# Patient Record
Sex: MALE | Race: BLACK OR AFRICAN AMERICAN | Employment: FULL TIME | URBAN - METROPOLITAN AREA
[De-identification: names, ages, dates, MRNs, and addresses within clinical notes are randomized per-mention and may not be internally consistent; named-entity substitution may affect disease eponyms.]

---

## 2017-12-09 ENCOUNTER — OFFICE VISIT (OUTPATIENT)
Dept: FAMILY MEDICINE CLINIC | Facility: CLINIC | Age: 37
End: 2017-12-09

## 2017-12-09 VITALS
SYSTOLIC BLOOD PRESSURE: 122 MMHG | RESPIRATION RATE: 16 BRPM | DIASTOLIC BLOOD PRESSURE: 78 MMHG | WEIGHT: 239 LBS | BODY MASS INDEX: 31.68 KG/M2 | TEMPERATURE: 99 F | HEIGHT: 73 IN | HEART RATE: 71 BPM

## 2017-12-09 DIAGNOSIS — S39.011A STRAIN OF ABDOMINAL MUSCLE, INITIAL ENCOUNTER: ICD-10-CM

## 2017-12-09 DIAGNOSIS — M99.02 THORACIC REGION SOMATIC DYSFUNCTION: ICD-10-CM

## 2017-12-09 DIAGNOSIS — R39.15 URINARY URGENCY: ICD-10-CM

## 2017-12-09 DIAGNOSIS — R14.3 EXCESSIVE FLATUS: ICD-10-CM

## 2017-12-09 DIAGNOSIS — R10.9 RIGHT FLANK DISCOMFORT: Primary | ICD-10-CM

## 2017-12-09 PROCEDURE — 98927 OSTEOPATH MANJ 5-6 REGIONS: CPT | Performed by: FAMILY MEDICINE

## 2017-12-09 PROCEDURE — 99214 OFFICE O/P EST MOD 30 MIN: CPT | Performed by: FAMILY MEDICINE

## 2017-12-09 PROCEDURE — 99212 OFFICE O/P EST SF 10 MIN: CPT | Performed by: FAMILY MEDICINE

## 2017-12-09 NOTE — PROGRESS NOTES
HPI:    Patient ID: Tramaine Devries is a 44year old male. Flank Pain    Pain location: right flank and RUQ of abdomen. This is a new problem. The current episode started more than 1 month ago. The problem occurs intermittently.  The quality of the Constitutional: He is oriented to person, place, and time. He appears well-developed and well-nourished. No distress. Cardiovascular: Normal rate, regular rhythm and normal heart sounds. No murmur heard.   Pulmonary/Chest: Effort normal and breath so

## 2017-12-23 ENCOUNTER — HOSPITAL ENCOUNTER (OUTPATIENT)
Dept: ULTRASOUND IMAGING | Facility: HOSPITAL | Age: 37
Discharge: HOME | End: 2017-12-23
Attending: FAMILY MEDICINE
Payer: COMMERCIAL

## 2017-12-23 DIAGNOSIS — R14.3 EXCESSIVE FLATUS: ICD-10-CM

## 2017-12-23 PROCEDURE — 76705 ECHO EXAM OF ABDOMEN: CPT | Performed by: FAMILY MEDICINE

## 2018-01-12 ENCOUNTER — HOSPITAL ENCOUNTER (OUTPATIENT)
Age: 38
Discharge: HOME | End: 2018-01-12
Attending: EMERGENCY MEDICINE
Payer: COMMERCIAL

## 2018-01-12 VITALS
OXYGEN SATURATION: 96 % | DIASTOLIC BLOOD PRESSURE: 54 MMHG | TEMPERATURE: 98 F | WEIGHT: 240 LBS | HEIGHT: 73 IN | SYSTOLIC BLOOD PRESSURE: 122 MMHG | RESPIRATION RATE: 18 BRPM | BODY MASS INDEX: 31.81 KG/M2 | HEART RATE: 65 BPM

## 2018-01-12 DIAGNOSIS — H65.92 LEFT OTITIS MEDIA WITH EFFUSION: Primary | ICD-10-CM

## 2018-01-12 DIAGNOSIS — J03.90 EXUDATIVE TONSILLITIS: ICD-10-CM

## 2018-01-12 LAB — S PYO AG THROAT QL: NEGATIVE

## 2018-01-12 PROCEDURE — 99214 OFFICE O/P EST MOD 30 MIN: CPT

## 2018-01-12 PROCEDURE — 99213 OFFICE O/P EST LOW 20 MIN: CPT

## 2018-01-12 PROCEDURE — 87430 STREP A AG IA: CPT

## 2018-01-12 RX ORDER — AMOXICILLIN 875 MG/1
875 TABLET, COATED ORAL 2 TIMES DAILY
Qty: 20 TABLET | Refills: 0 | Status: SHIPPED | OUTPATIENT
Start: 2018-01-12 | End: 2018-01-22

## 2018-01-13 NOTE — ED PROVIDER NOTES
Patient Seen in: [facility]    History   Patient presents with:  Sore Throat    Stated Complaint: sore throat    HPI    Patient is a 25-year-old male with no significant past medical history presents now with sore throat murmur  Abdomen: Soft, nontender and nondistended  Neurologic: Patient is awake, alert and oriented ×3.   The patient's motor strength is 5 out of 5 and symmetric in the upper and lower extremities bilaterally  Extremities: No focal swelling or tenderness

## 2018-02-01 ENCOUNTER — TELEPHONE (OUTPATIENT)
Dept: SURGERY | Facility: CLINIC | Age: 38
End: 2018-02-01

## 2018-02-01 NOTE — TELEPHONE ENCOUNTER
Pt was in today at 3:00pm for consult with Dr. Sulma Mcdonnell. Pt waited until 4:00pm in the exam room and had to leave without seeing the doctor.  I apologized to the patient for the wait, pt stated he could not wait and asked to be rescheduled with Dr. Sally Rosario

## 2018-02-06 NOTE — TELEPHONE ENCOUNTER
I called pt LM to call the office back. I was going to offer pt today with Dr. Rangel Lantigua in the office at 2:00pm for consult. However I just LMTCB.

## 2018-02-08 ENCOUNTER — OFFICE VISIT (OUTPATIENT)
Dept: SURGERY | Facility: CLINIC | Age: 38
End: 2018-02-08

## 2018-02-08 VITALS
BODY MASS INDEX: 31.14 KG/M2 | DIASTOLIC BLOOD PRESSURE: 60 MMHG | HEIGHT: 73 IN | HEART RATE: 68 BPM | WEIGHT: 235 LBS | RESPIRATION RATE: 16 BRPM | TEMPERATURE: 99 F | SYSTOLIC BLOOD PRESSURE: 128 MMHG

## 2018-02-08 DIAGNOSIS — R63.1 POLYDIPSIA: ICD-10-CM

## 2018-02-08 DIAGNOSIS — Z80.42 FAMILY HISTORY OF PROSTATE CANCER: ICD-10-CM

## 2018-02-08 DIAGNOSIS — R33.9 INCOMPLETE EMPTYING OF BLADDER: ICD-10-CM

## 2018-02-08 DIAGNOSIS — R35.0 URINARY FREQUENCY: Primary | ICD-10-CM

## 2018-02-08 DIAGNOSIS — R35.1 NOCTURIA: ICD-10-CM

## 2018-02-08 LAB
BACTERIA UR QL AUTO: NEGATIVE /HPF
BILIRUB UR QL: NEGATIVE
CLARITY UR: CLEAR
COLOR UR: YELLOW
GLUCOSE UR-MCNC: NEGATIVE MG/DL
HGB UR QL STRIP.AUTO: NEGATIVE
KETONES UR-MCNC: NEGATIVE MG/DL
LEUKOCYTE ESTERASE UR QL STRIP.AUTO: NEGATIVE
NITRITE UR QL STRIP.AUTO: NEGATIVE
PH UR: 6 [PH] (ref 5–8)
PROT UR-MCNC: NEGATIVE MG/DL
RBC #/AREA URNS AUTO: 1 /HPF
SP GR UR STRIP: 1.02 (ref 1–1.03)
UROBILINOGEN UR STRIP-ACNC: 2
VIT C UR-MCNC: 20 MG/DL
WBC #/AREA URNS AUTO: <1 /HPF

## 2018-02-08 PROCEDURE — 99212 OFFICE O/P EST SF 10 MIN: CPT | Performed by: UROLOGY

## 2018-02-08 PROCEDURE — 99245 OFF/OP CONSLTJ NEW/EST HI 55: CPT | Performed by: UROLOGY

## 2018-02-08 NOTE — PATIENT INSTRUCTIONS
1. Urine specimen for complete urinalysis    2. Intake voiding diary for 3 separate days and nights--please try to fill it out completely. Please bring the filled out form with you the next time you see me in the office. Very important information.

## 2018-02-08 NOTE — PROGRESS NOTES
Francine Rey is a 44year old male. HPI:   Patient presents with:  Urinary Frequency  Urinary Symptoms (urologic)      History provided by pt.     Voiding difficulties --  His American urologic Association voiding score is  20,  severe  voiding d normal findings.  11/20/2013 office visit with Dr. Samreen López; provided him with samples of Rapaflo 8 mg daily as well as Toviaz 4 mg daily;  neither of which improved his symptoms; Dr. Samreen López in note stated = \"it is not clear to me that the patient's symptoms extremity and irregular heartbeat/palpitations  Constitutional:  Negative for decreased activity, fever, irritability and lethargy  ENMT:  Negative for ear drainage and nasal drainage; positive for hearing loss   Eyes:  Negative for eye discharge and visio clubbing; edema none      LABORATORIES    04/08/2014 creatinine = 1.26; eGFRAA >60  03/10/2014 UA RBC = 2  10/01/2012 PSA = 0.4    UROLOGICAL IMAGING     11/17/14 CT of abdomen and pelvis with contrast  Kidneys were normal.  Bladder small with moderate dif present, however, he chooses to proceed with Urinalysis, intake voiding diary, bladder Ultrasound, and follow up in a few weeks with probable uroflow testing at that time.    (R33.9) Incomplete emptying of bladder  Please see above.   Bladder ultrasound kayode Tereza Og MD.   Electronically Signed: Sandra Bell, 2/8/2018, 3:44 PM.    Lili Kay MD,  personally performed the services described in this documentation.  All medical record entries made by the scribe were at my direction and in my pre

## 2018-02-21 ENCOUNTER — HOSPITAL ENCOUNTER (EMERGENCY)
Facility: HOSPITAL | Age: 38
Discharge: HOME | End: 2018-02-21
Attending: EMERGENCY MEDICINE
Payer: COMMERCIAL

## 2018-02-21 VITALS
TEMPERATURE: 98 F | HEART RATE: 70 BPM | WEIGHT: 231 LBS | DIASTOLIC BLOOD PRESSURE: 68 MMHG | RESPIRATION RATE: 18 BRPM | HEIGHT: 73 IN | BODY MASS INDEX: 30.62 KG/M2 | SYSTOLIC BLOOD PRESSURE: 124 MMHG | OXYGEN SATURATION: 98 %

## 2018-02-21 DIAGNOSIS — R42 VERTIGO: Primary | ICD-10-CM

## 2018-02-21 LAB
ANION GAP SERPL CALC-SCNC: 9 MMOL/L (ref 0–18)
BASOPHILS # BLD: 0 K/UL (ref 0–0.2)
BASOPHILS NFR BLD: 0 %
BUN SERPL-MCNC: 11 MG/DL (ref 8–20)
BUN/CREAT SERPL: 9.3 (ref 10–20)
CALCIUM SERPL-MCNC: 8.9 MG/DL (ref 8.5–10.5)
CHLORIDE SERPL-SCNC: 101 MMOL/L (ref 95–110)
CO2 SERPL-SCNC: 27 MMOL/L (ref 22–32)
CREAT SERPL-MCNC: 1.18 MG/DL (ref 0.5–1.5)
EOSINOPHIL # BLD: 0.1 K/UL (ref 0–0.7)
EOSINOPHIL NFR BLD: 2 %
ERYTHROCYTE [DISTWIDTH] IN BLOOD BY AUTOMATED COUNT: 12.6 % (ref 11–15)
GLUCOSE SERPL-MCNC: 92 MG/DL (ref 70–99)
HCT VFR BLD AUTO: 40.5 % (ref 41–52)
HGB BLD-MCNC: 13 G/DL (ref 13.5–17.5)
LYMPHOCYTES # BLD: 2.7 K/UL (ref 1–4)
LYMPHOCYTES NFR BLD: 38 %
MCH RBC QN AUTO: 29.7 PG (ref 27–32)
MCHC RBC AUTO-ENTMCNC: 32.2 G/DL (ref 32–37)
MCV RBC AUTO: 92.1 FL (ref 80–100)
MONOCYTES # BLD: 0.8 K/UL (ref 0–1)
MONOCYTES NFR BLD: 10 %
NEUTROPHILS # BLD AUTO: 3.7 K/UL (ref 1.8–7.7)
NEUTROPHILS NFR BLD: 50 %
OSMOLALITY UR CALC.SUM OF ELEC: 283 MOSM/KG (ref 275–295)
PLATELET # BLD AUTO: 187 K/UL (ref 140–400)
PMV BLD AUTO: 9.6 FL (ref 7.4–10.3)
POTASSIUM SERPL-SCNC: 3.9 MMOL/L (ref 3.3–5.1)
RBC # BLD AUTO: 4.4 M/UL (ref 4.5–5.9)
SODIUM SERPL-SCNC: 137 MMOL/L (ref 136–144)
WBC # BLD AUTO: 7.3 K/UL (ref 4–11)

## 2018-02-21 PROCEDURE — 85025 COMPLETE CBC W/AUTO DIFF WBC: CPT | Performed by: EMERGENCY MEDICINE

## 2018-02-21 PROCEDURE — 80048 BASIC METABOLIC PNL TOTAL CA: CPT | Performed by: EMERGENCY MEDICINE

## 2018-02-21 PROCEDURE — 93010 ELECTROCARDIOGRAM REPORT: CPT | Performed by: INTERNAL MEDICINE

## 2018-02-21 PROCEDURE — 93005 ELECTROCARDIOGRAM TRACING: CPT

## 2018-02-21 PROCEDURE — 99284 EMERGENCY DEPT VISIT MOD MDM: CPT

## 2018-02-21 PROCEDURE — 36415 COLL VENOUS BLD VENIPUNCTURE: CPT

## 2018-02-21 PROCEDURE — 80048 BASIC METABOLIC PNL TOTAL CA: CPT

## 2018-02-21 PROCEDURE — 85025 COMPLETE CBC W/AUTO DIFF WBC: CPT

## 2018-02-21 RX ORDER — MECLIZINE HYDROCHLORIDE 25 MG/1
25 TABLET ORAL ONCE
Status: COMPLETED | OUTPATIENT
Start: 2018-02-21 | End: 2018-02-21

## 2018-02-21 RX ORDER — MECLIZINE HYDROCHLORIDE 25 MG/1
25 TABLET ORAL 3 TIMES DAILY PRN
Qty: 20 TABLET | Refills: 0 | Status: SHIPPED | OUTPATIENT
Start: 2018-02-21 | End: 2021-03-15 | Stop reason: ALTCHOICE

## 2018-02-22 NOTE — ED PROVIDER NOTES
Patient Seen in: [facility] Emergency Department    History   Patient presents with:  Dizziness (neurologic)    Stated Complaint: lightheaded     HPI    Patient is a 76-year-old male who presents to the emergency department with a chief complaint There is no tenderness. There is no rebound. Musculoskeletal: Normal range of motion. Neurological: He is alert and oriented to person, place, and time. He has normal strength. No cranial nerve deficit or sensory deficit.  He displays a negative Romberg [address] [facility]  [phone]    Schedule an appointment as soon as possible for a visit in 3 days          Medications Prescribed:  Current Discharge Medication List    START taking these medications    Meclizine HCl 25 MG Oral Tab  Take 1 tablet (

## 2018-09-24 ENCOUNTER — NURSE TRIAGE (OUTPATIENT)
Dept: OTHER | Age: 38
End: 2018-09-24

## 2018-09-24 NOTE — TELEPHONE ENCOUNTER
Contacted patient as appears patient has not called back. LMTCB to assess symptoms. Transfer to [phone].

## 2018-09-24 NOTE — TELEPHONE ENCOUNTER
Wife calling stating patient having pain in his side however she is not with patient but has him on the other line on her phone. Advised patient wife to have patient call back directly so he can be further assessed.  Wife verbalized understanding

## 2018-09-25 NOTE — TELEPHONE ENCOUNTER
Action Requested: Summary for Provider     []  Critical Lab, Recommendations Needed  [] Need Additional Advice  [x]   FYI    []   Need Orders  [] Need Medications Sent to Pharmacy  []  Other     SUMMARY: Patient complaining of right rib/abdominal pain on t

## 2018-10-15 ENCOUNTER — OFFICE VISIT (OUTPATIENT)
Dept: FAMILY MEDICINE CLINIC | Facility: CLINIC | Age: 38
End: 2018-10-15

## 2018-10-15 VITALS
HEIGHT: 73 IN | DIASTOLIC BLOOD PRESSURE: 75 MMHG | BODY MASS INDEX: 31.68 KG/M2 | SYSTOLIC BLOOD PRESSURE: 123 MMHG | RESPIRATION RATE: 17 BRPM | WEIGHT: 239 LBS | HEART RATE: 71 BPM | TEMPERATURE: 98 F

## 2018-10-15 DIAGNOSIS — R07.89 CHEST WALL PAIN: ICD-10-CM

## 2018-10-15 DIAGNOSIS — M99.02 THORACIC REGION SOMATIC DYSFUNCTION: Primary | ICD-10-CM

## 2018-10-15 PROCEDURE — 99214 OFFICE O/P EST MOD 30 MIN: CPT | Performed by: FAMILY MEDICINE

## 2018-10-15 PROCEDURE — 98925 OSTEOPATH MANJ 1-2 REGIONS: CPT | Performed by: FAMILY MEDICINE

## 2018-10-15 PROCEDURE — 99212 OFFICE O/P EST SF 10 MIN: CPT | Performed by: FAMILY MEDICINE

## 2018-10-15 NOTE — PROGRESS NOTES
HPI:    Patient ID: Lan Rivera is a 43year old male. Abdominal Pain   This is a chronic problem. The current episode started more than 1 month ago. The onset quality is gradual. The problem occurs intermittently.  The problem has been waxing a Pulmonary/Chest: Effort normal and breath sounds normal.   Musculoskeletal:        Thoracic back: He exhibits decreased range of motion, tenderness, pain and spasm.         Arms:  Region of pain as depicted   Neurological: He is alert and oriented to person

## 2019-04-23 ENCOUNTER — OFFICE VISIT (OUTPATIENT)
Dept: FAMILY MEDICINE CLINIC | Facility: CLINIC | Age: 39
End: 2019-04-23

## 2019-04-23 ENCOUNTER — APPOINTMENT (OUTPATIENT)
Dept: LAB | Age: 39
End: 2019-04-23
Attending: FAMILY MEDICINE
Payer: COMMERCIAL

## 2019-04-23 VITALS
BODY MASS INDEX: 31.28 KG/M2 | RESPIRATION RATE: 17 BRPM | TEMPERATURE: 98 F | SYSTOLIC BLOOD PRESSURE: 115 MMHG | DIASTOLIC BLOOD PRESSURE: 77 MMHG | WEIGHT: 236 LBS | HEART RATE: 81 BPM | HEIGHT: 73 IN

## 2019-04-23 DIAGNOSIS — N52.9 ERECTILE DYSFUNCTION, UNSPECIFIED ERECTILE DYSFUNCTION TYPE: Primary | ICD-10-CM

## 2019-04-23 DIAGNOSIS — N52.9 ERECTILE DYSFUNCTION, UNSPECIFIED ERECTILE DYSFUNCTION TYPE: ICD-10-CM

## 2019-04-23 PROCEDURE — 99214 OFFICE O/P EST MOD 30 MIN: CPT | Performed by: FAMILY MEDICINE

## 2019-04-23 PROCEDURE — 84402 ASSAY OF FREE TESTOSTERONE: CPT

## 2019-04-23 PROCEDURE — 99212 OFFICE O/P EST SF 10 MIN: CPT | Performed by: FAMILY MEDICINE

## 2019-04-23 PROCEDURE — 36415 COLL VENOUS BLD VENIPUNCTURE: CPT

## 2019-04-23 PROCEDURE — 84403 ASSAY OF TOTAL TESTOSTERONE: CPT

## 2019-04-23 RX ORDER — TADALAFIL 20 MG/1
20 TABLET ORAL
Qty: 8 TABLET | Refills: 2 | Status: SHIPPED | OUTPATIENT
Start: 2019-04-23 | End: 2019-04-26

## 2019-04-23 NOTE — PROGRESS NOTES
HPI:    Patient ID: Germania Dawn is a 43year old male. Patient is a 28-year-old -American male here today concerned with his testosterone levels as he is starting to experience some sexual encounters that require penetration feeling.   Hi Referrals:  None  Patient Instructions   Testosterone level to be checked. May need urology consult. Cialis ED medication prescribed for. Return in about 3 weeks (around 5/14/2019), or if symptoms worsen or fail to improve.          FF#[id number]

## 2019-04-24 ENCOUNTER — TELEPHONE (OUTPATIENT)
Dept: FAMILY MEDICINE CLINIC | Facility: CLINIC | Age: 39
End: 2019-04-24

## 2019-04-24 DIAGNOSIS — N52.9 ERECTILE DYSFUNCTION, UNSPECIFIED ERECTILE DYSFUNCTION TYPE: ICD-10-CM

## 2019-04-24 NOTE — TELEPHONE ENCOUNTER
PA for Tadalafil 20 mg tab completed with [employer] via CMM response time 24-72 hours KEY [id number].

## 2019-04-24 NOTE — TELEPHONE ENCOUNTER
Per Magali, Tadalafil 20mg tablets are not covered under patient's insurance. Please call [phone] to initiate a prior authorization or change medication, pt ID# [id number].

## 2019-04-26 RX ORDER — TADALAFIL 20 MG/1
20 TABLET ORAL
Qty: 6 TABLET | Refills: 2 | Status: SHIPPED | OUTPATIENT
Start: 2019-04-26 | End: 2021-01-12

## 2019-04-27 NOTE — TELEPHONE ENCOUNTER
Spoke with patient and informed plan only covers up to 6 tablets per month and a new rx was sent to his pharmacy, patient has no further questions at this time.

## 2019-07-07 ENCOUNTER — HOSPITAL ENCOUNTER (EMERGENCY)
Facility: HOSPITAL | Age: 39
Discharge: HOME | End: 2019-07-07
Attending: EMERGENCY MEDICINE
Payer: COMMERCIAL

## 2019-07-07 VITALS
SYSTOLIC BLOOD PRESSURE: 107 MMHG | OXYGEN SATURATION: 97 % | WEIGHT: 233 LBS | BODY MASS INDEX: 30.88 KG/M2 | HEART RATE: 71 BPM | TEMPERATURE: 97 F | HEIGHT: 73 IN | RESPIRATION RATE: 16 BRPM | DIASTOLIC BLOOD PRESSURE: 62 MMHG

## 2019-07-07 DIAGNOSIS — S39.012A STRAIN OF LUMBAR REGION, INITIAL ENCOUNTER: ICD-10-CM
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
## 2019-07-07 DIAGNOSIS — S16.1XXA STRAIN OF NECK MUSCLE, INITIAL ENCOUNTER: Primary | ICD-10-CM

## 2019-07-07 PROCEDURE — 99283 EMERGENCY DEPT VISIT LOW MDM: CPT

## 2019-07-07 RX ORDER — IBUPROFEN 600 MG/1
600 TABLET ORAL ONCE
Status: COMPLETED | OUTPATIENT
Start: 2019-07-07 | End: 2019-07-07

## 2019-07-07 RX ORDER — CYCLOBENZAPRINE HCL 10 MG
10 TABLET ORAL 3 TIMES DAILY PRN
Qty: 10 TABLET | Refills: 0 | Status: SHIPPED | OUTPATIENT
Start: 2019-07-07 | End: 2019-07-14

## 2019-07-07 RX ORDER — CYCLOBENZAPRINE HCL 10 MG
10 TABLET ORAL ONCE
Status: COMPLETED | OUTPATIENT
Start: 2019-07-07 | End: 2019-07-07

## 2019-07-07 RX ORDER — IBUPROFEN 600 MG/1
600 TABLET ORAL ONCE
Status: DISCONTINUED | OUTPATIENT
Start: 2019-07-07 | End: 2019-07-07

## 2019-07-07 RX ORDER — KETOROLAC TROMETHAMINE 10 MG/1
10 TABLET, FILM COATED ORAL EVERY 6 HOURS PRN
Qty: 12 TABLET | Refills: 0 | Status: SHIPPED | OUTPATIENT
Start: 2019-07-07 | End: 2019-07-14
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
## 2020-11-11 ENCOUNTER — HOSPITAL ENCOUNTER (OUTPATIENT)
Age: 40
Discharge: HOME | End: 2020-11-11
Attending: EMERGENCY MEDICINE
Payer: COMMERCIAL

## 2020-11-11 VITALS
SYSTOLIC BLOOD PRESSURE: 123 MMHG | HEART RATE: 88 BPM | OXYGEN SATURATION: 100 % | RESPIRATION RATE: 18 BRPM | DIASTOLIC BLOOD PRESSURE: 62 MMHG | TEMPERATURE: 98 F

## 2020-11-11 DIAGNOSIS — Z20.822 ENCOUNTER FOR LABORATORY TESTING FOR COVID-19 VIRUS: ICD-10-CM

## 2020-11-11 DIAGNOSIS — Z20.822 EXPOSURE TO COVID-19 VIRUS: Primary | ICD-10-CM

## 2020-11-11 PROCEDURE — 99213 OFFICE O/P EST LOW 20 MIN: CPT

## 2020-11-11 NOTE — ED PROVIDER NOTES
Patient Seen in: [facility]      History   Patient presents with:  Testing    Stated Complaint: exposed to covid    HPI    38year old male presents for evaluation after an exposure to COVID-19. Patient states that they are asymptomatic.   Pa diaphoretic. HENT:      Head: Normocephalic and atraumatic. Right Ear: External ear normal.      Left Ear: External ear normal.      Nose: Nose normal. No nasal deformity. Mouth/Throat:      Lips: Pink.    Eyes:      General: Lids are normal. [facility] [place] [address]  [phone]    Schedule an appointment as soon as possible for a visit in 2 days  For follow up and re-evaluation          Medications Prescribed:  Current Discharge Medication List

## 2021-01-11 DIAGNOSIS — N52.9 ERECTILE DYSFUNCTION, UNSPECIFIED ERECTILE DYSFUNCTION TYPE: ICD-10-CM

## 2021-01-12 RX ORDER — TADALAFIL 20 MG/1
TABLET ORAL
Qty: 8 TABLET | Refills: 0 | Status: SHIPPED | OUTPATIENT
Start: 2021-01-12 | End: 2021-03-15

## 2021-03-15 ENCOUNTER — OFFICE VISIT (OUTPATIENT)
Dept: FAMILY MEDICINE CLINIC | Facility: CLINIC | Age: 41
End: 2021-03-15

## 2021-03-15 VITALS
HEIGHT: 73 IN | BODY MASS INDEX: 32.34 KG/M2 | DIASTOLIC BLOOD PRESSURE: 65 MMHG | RESPIRATION RATE: 17 BRPM | SYSTOLIC BLOOD PRESSURE: 100 MMHG | HEART RATE: 72 BPM | WEIGHT: 244 LBS

## 2021-03-15 DIAGNOSIS — N40.0 ENLARGED PROSTATE: ICD-10-CM

## 2021-03-15 DIAGNOSIS — N52.9 ERECTILE DYSFUNCTION, UNSPECIFIED ERECTILE DYSFUNCTION TYPE: ICD-10-CM

## 2021-03-15 DIAGNOSIS — R39.9 URINARY SYMPTOM OR SIGN: Primary | ICD-10-CM

## 2021-03-15 PROCEDURE — 3078F DIAST BP <80 MM HG: CPT | Performed by: FAMILY MEDICINE

## 2021-03-15 PROCEDURE — 3074F SYST BP LT 130 MM HG: CPT | Performed by: FAMILY MEDICINE

## 2021-03-15 PROCEDURE — 3008F BODY MASS INDEX DOCD: CPT | Performed by: FAMILY MEDICINE

## 2021-03-15 PROCEDURE — 99214 OFFICE O/P EST MOD 30 MIN: CPT | Performed by: FAMILY MEDICINE

## 2021-03-15 RX ORDER — TADALAFIL 20 MG/1
20 TABLET ORAL
Qty: 8 TABLET | Refills: 2 | Status: SHIPPED | OUTPATIENT
Start: 2021-03-15

## 2021-03-15 NOTE — PATIENT INSTRUCTIONS
To urologist.   Continue with hydration with clear water hydration. Cherry tomatoes recommended. Cialis renewed (20 mg) orally daily.

## 2021-03-18 NOTE — PROGRESS NOTES
HPI/Subjective:   Patient ID: Tramaine Devries is a 38year old male. This patient is a 50-year-old -American male who is well-established at our clinic who has had issues in the past with his prostate because of lower urinary symptoms.   Veronika Rodriguez Oral Tab; Take 1 tablet (20 mg total) by mouth daily as needed for Erectile Dysfunction. Dispense: 8 tablet; Refill: 2    No orders of the defined types were placed in this encounter.       Meds This Visit:  Requested Prescriptions     Signed Prescriptions

## 2021-04-22 ENCOUNTER — LAB ENCOUNTER (OUTPATIENT)
Dept: LAB | Facility: HOSPITAL | Age: 41
End: 2021-04-22
Attending: UROLOGY
Payer: COMMERCIAL

## 2021-04-22 ENCOUNTER — OFFICE VISIT (OUTPATIENT)
Dept: SURGERY | Facility: CLINIC | Age: 41
End: 2021-04-22

## 2021-04-22 VITALS
HEIGHT: 73 IN | SYSTOLIC BLOOD PRESSURE: 128 MMHG | RESPIRATION RATE: 16 BRPM | BODY MASS INDEX: 30.48 KG/M2 | WEIGHT: 230 LBS | DIASTOLIC BLOOD PRESSURE: 77 MMHG | HEART RATE: 86 BPM

## 2021-04-22 DIAGNOSIS — N40.1 BENIGN PROSTATIC HYPERPLASIA WITH LOWER URINARY TRACT SYMPTOMS, SYMPTOM DETAILS UNSPECIFIED: ICD-10-CM

## 2021-04-22 DIAGNOSIS — N40.1 BENIGN PROSTATIC HYPERPLASIA WITH LOWER URINARY TRACT SYMPTOMS, SYMPTOM DETAILS UNSPECIFIED: Primary | ICD-10-CM

## 2021-04-22 PROCEDURE — 3074F SYST BP LT 130 MM HG: CPT | Performed by: UROLOGY

## 2021-04-22 PROCEDURE — 84153 ASSAY OF PSA TOTAL: CPT

## 2021-04-22 PROCEDURE — 99244 OFF/OP CNSLTJ NEW/EST MOD 40: CPT | Performed by: UROLOGY

## 2021-04-22 PROCEDURE — 36415 COLL VENOUS BLD VENIPUNCTURE: CPT

## 2021-04-22 PROCEDURE — 81001 URINALYSIS AUTO W/SCOPE: CPT

## 2021-04-22 PROCEDURE — 3008F BODY MASS INDEX DOCD: CPT | Performed by: UROLOGY

## 2021-04-22 PROCEDURE — 3078F DIAST BP <80 MM HG: CPT | Performed by: UROLOGY

## 2021-04-22 NOTE — PROGRESS NOTES
SUBJECTIVE:  Tia Part is a 38year old male who presents for a consultation at the request of, and a copy of this note will be sent to, Dr. Megan Galo, for evaluation of  benign prostatic hyperplasia and urinary frquency.  He states that chest discomfort, dizziness or fainting, palpitations, leg swelling, nocturia, or claudication. GASTROINTESTINAL:  Negative for nausea, vomiting, diarrhea, constipation, heartburn or indigestion, abdominal pains, bloody or tarry stools.   GENERAL: Denies: prescriptions requested or ordered in this encounter       Imaging & Referrals:  None

## 2021-05-01 ENCOUNTER — TELEPHONE (OUTPATIENT)
Dept: FAMILY MEDICINE CLINIC | Facility: CLINIC | Age: 41
End: 2021-05-01

## 2021-05-01 DIAGNOSIS — Z20.822 ENCOUNTER FOR SCREENING LABORATORY TESTING FOR COVID-19 VIRUS IN ASYMPTOMATIC PATIENT: Primary | ICD-10-CM

## 2021-05-24 ENCOUNTER — LAB ENCOUNTER (OUTPATIENT)
Dept: LAB | Facility: HOSPITAL | Age: 41
End: 2021-05-24
Attending: FAMILY MEDICINE
Payer: COMMERCIAL

## 2021-05-24 DIAGNOSIS — Z20.822 ENCOUNTER FOR SCREENING LABORATORY TESTING FOR COVID-19 VIRUS IN ASYMPTOMATIC PATIENT: ICD-10-CM

## 2021-10-07 ENCOUNTER — OFFICE VISIT (OUTPATIENT)
Dept: FAMILY MEDICINE CLINIC | Facility: CLINIC | Age: 41
End: 2021-10-07

## 2021-10-07 VITALS
DIASTOLIC BLOOD PRESSURE: 74 MMHG | WEIGHT: 236 LBS | HEART RATE: 75 BPM | HEIGHT: 73 IN | RESPIRATION RATE: 16 BRPM | SYSTOLIC BLOOD PRESSURE: 109 MMHG | BODY MASS INDEX: 31.28 KG/M2

## 2021-10-07 DIAGNOSIS — R14.3 EXCESSIVE FLATUS: ICD-10-CM

## 2021-10-07 DIAGNOSIS — R12 HEARTBURN: ICD-10-CM

## 2021-10-07 DIAGNOSIS — E55.9 VITAMIN D INSUFFICIENCY: Primary | ICD-10-CM

## 2021-10-07 DIAGNOSIS — K59.00 CONSTIPATION, UNSPECIFIED CONSTIPATION TYPE: ICD-10-CM

## 2021-10-07 PROCEDURE — 3008F BODY MASS INDEX DOCD: CPT | Performed by: FAMILY MEDICINE

## 2021-10-07 PROCEDURE — 3078F DIAST BP <80 MM HG: CPT | Performed by: FAMILY MEDICINE

## 2021-10-07 PROCEDURE — 99214 OFFICE O/P EST MOD 30 MIN: CPT | Performed by: FAMILY MEDICINE

## 2021-10-07 PROCEDURE — 3074F SYST BP LT 130 MM HG: CPT | Performed by: FAMILY MEDICINE

## 2021-10-07 NOTE — PATIENT INSTRUCTIONS
Vitamin D3 level check on today. We will order a liver/gallbladder ultrasound just to make sure there are no gallstones.   Patient has been encouraged to perhaps try probiotics  (Culturelle) for 1 week to see if this produces a better bowel motility as it

## 2021-10-07 NOTE — PROGRESS NOTES
Subjective:   Patient ID: Chance Francois is a 42year old male. This patient is a 58-year-old -American male who presents to the clinic today with a concern but no physical complaints connected to no bowel movements for the last 3 days.   He [E]      Meds This Visit:  Requested Prescriptions      No prescriptions requested or ordered in this encounter       Imaging & Referrals:  US GALLBLADDER (LUU=[id number])   Patient Instructions   Vitamin D3 level check on today.   We will order a liver/gallblad

## 2021-12-14 ENCOUNTER — IMMUNIZATION (OUTPATIENT)
Dept: LAB | Facility: HOSPITAL | Age: 41
End: 2021-12-14
Attending: EMERGENCY MEDICINE
Payer: COMMERCIAL

## 2021-12-14 DIAGNOSIS — Z23 NEED FOR VACCINATION: Primary | ICD-10-CM

## 2021-12-14 PROCEDURE — 0064A SARSCOV2 VAC 50MCG/0.25ML IM: CPT

## 2022-06-29 ENCOUNTER — HOSPITAL ENCOUNTER (OUTPATIENT)
Age: 42
Discharge: HOME | End: 2022-06-29
Payer: COMMERCIAL

## 2022-06-29 VITALS
HEIGHT: 73.5 IN | BODY MASS INDEX: 31.13 KG/M2 | DIASTOLIC BLOOD PRESSURE: 76 MMHG | WEIGHT: 240 LBS | RESPIRATION RATE: 18 BRPM | HEART RATE: 107 BPM | SYSTOLIC BLOOD PRESSURE: 135 MMHG | OXYGEN SATURATION: 97 % | TEMPERATURE: 99 F

## 2022-06-29 DIAGNOSIS — U07.1 COVID-19: Primary | ICD-10-CM

## 2022-06-29 LAB — SARS-COV-2 RNA RESP QL NAA+PROBE: DETECTED

## 2022-06-29 PROCEDURE — U0002 COVID-19 LAB TEST NON-CDC: HCPCS | Performed by: NURSE PRACTITIONER

## 2022-06-29 PROCEDURE — 99203 OFFICE O/P NEW LOW 30 MIN: CPT | Performed by: NURSE PRACTITIONER

## 2022-06-29 RX ORDER — NIRMATRELVIR AND RITONAVIR 300-100 MG
KIT ORAL
Qty: 30 TABLET | Refills: 0 | Status: SHIPPED | OUTPATIENT
Start: 2022-06-29 | End: 2022-07-04

## 2022-06-29 RX ORDER — IBUPROFEN 600 MG/1
600 TABLET ORAL ONCE
Status: COMPLETED | OUTPATIENT
Start: 2022-06-29 | End: 2022-06-29

## 2022-07-15 DIAGNOSIS — N52.9 ERECTILE DYSFUNCTION, UNSPECIFIED ERECTILE DYSFUNCTION TYPE: ICD-10-CM

## 2022-07-15 RX ORDER — TADALAFIL 20 MG/1
20 TABLET ORAL DAILY PRN
Qty: 8 TABLET | Refills: 2 | Status: SHIPPED | OUTPATIENT
Start: 2022-07-15

## 2022-07-15 NOTE — TELEPHONE ENCOUNTER
Refill passed per [facility] protocol.     Requested Prescriptions   Pending Prescriptions Disp Refills    TADALAFIL 20 MG Oral Tab [Pharmacy Med Name: TADALAFIL 20MG TABLETS] 8 tablet 2     Sig: TAKE 1 TABLET BY MOUTH EVERY DAY AS NEEDED        Genitourinary Medications Passed - 7/15/2022  3:59 PM        Passed - Patient does not have pulmonary hypertension on problem list        Passed - In person appointment or virtual visit in the past 12 mos or appointment in next 3 mos       Recent Outpatient Visits              9 months ago Vitamin D insufficiency    [facility], Donna Mia, DO    Office Visit    1 year ago Benign prostatic hyperplasia with lower urinary tract symptoms, symptom details unspecified    [facility], [address], Juanita Lion MD    Office Visit    1 year ago Urinary symptom or sign    [address] [facility], Donna Mia, DO    Office Visit    3 years ago Erectile dysfunction, unspecified erectile dysfunction type    [place] [facility], [address] [place], Donnadom Mia, DO    Office Visit    3 years ago Thoracic region somatic dysfunction    [place] [facility], [address] [place], Donna Musca, DO    Office Visit                       Recent Outpatient Visits              9 months ago Vitamin D insufficiency    [address] [facility], Donna Mia, DO    Office Visit    1 year ago Benign prostatic hyperplasia with lower urinary tract symptoms, symptom details unspecified    [facility] for Court[place] Sampson MD    Office Visit    1 year ago Urinary symptom or sign    [address] [facility], Donna Mia, DO    Office Visit    3 years ago Erectile dysfunction, unspecified erectile dysfunction type    [place] [facility], [address] [place], Donnadom Mia, DO    Office Visit    3 years ago Thoracic region somatic dysfunction    [address], Mathieu [place], [place]    Office Visit

## 2022-07-16 ENCOUNTER — TELEPHONE (OUTPATIENT)
Dept: FAMILY MEDICINE CLINIC | Facility: CLINIC | Age: 42
End: 2022-07-16

## 2022-07-16 DIAGNOSIS — N52.9 ERECTILE DYSFUNCTION, UNSPECIFIED ERECTILE DYSFUNCTION TYPE: ICD-10-CM

## 2022-07-16 NOTE — TELEPHONE ENCOUNTER
Prior Auth needed for RX   TADALAFIL 20MG TABLETS     PLS SUBMIT TO GO.[facility]/LOGIN    KEY [id number]    pls advise

## 2022-07-20 NOTE — TELEPHONE ENCOUNTER
Please inform the patient that his insurance company has denied covering the payment for his prescription. I am not sure whether this will help, but I do advise that he call his insurance company and again the details as to why this medicine will not be covered. Thank you.

## 2023-03-30 ENCOUNTER — OFFICE VISIT (OUTPATIENT)
Dept: FAMILY MEDICINE CLINIC | Facility: CLINIC | Age: 43
End: 2023-03-30

## 2023-03-30 VITALS
SYSTOLIC BLOOD PRESSURE: 120 MMHG | HEIGHT: 73.23 IN | DIASTOLIC BLOOD PRESSURE: 75 MMHG | BODY MASS INDEX: 32.39 KG/M2 | HEART RATE: 73 BPM | TEMPERATURE: 98 F | WEIGHT: 247 LBS

## 2023-03-30 DIAGNOSIS — R10.11 RUQ PAIN: ICD-10-CM

## 2023-03-30 DIAGNOSIS — H91.91 HEARING LOSS OF RIGHT EAR, UNSPECIFIED HEARING LOSS TYPE: Primary | ICD-10-CM

## 2023-03-30 PROCEDURE — 99214 OFFICE O/P EST MOD 30 MIN: CPT | Performed by: FAMILY MEDICINE

## 2023-03-30 PROCEDURE — 3008F BODY MASS INDEX DOCD: CPT | Performed by: FAMILY MEDICINE

## 2023-03-30 PROCEDURE — 3078F DIAST BP <80 MM HG: CPT | Performed by: FAMILY MEDICINE

## 2023-03-30 PROCEDURE — 3074F SYST BP LT 130 MM HG: CPT | Performed by: FAMILY MEDICINE

## 2023-04-13 ENCOUNTER — HOSPITAL ENCOUNTER (OUTPATIENT)
Dept: ULTRASOUND IMAGING | Facility: HOSPITAL | Age: 43
Discharge: HOME | End: 2023-04-13
Attending: FAMILY MEDICINE
Payer: COMMERCIAL

## 2023-04-13 DIAGNOSIS — R10.11 RUQ PAIN: ICD-10-CM

## 2023-04-13 PROCEDURE — 76700 US EXAM ABDOM COMPLETE: CPT | Performed by: FAMILY MEDICINE

## 2023-04-17 ENCOUNTER — LAB ENCOUNTER (OUTPATIENT)
Dept: LAB | Age: 43
End: 2023-04-17
Attending: FAMILY MEDICINE
Payer: COMMERCIAL

## 2023-04-17 ENCOUNTER — OFFICE VISIT (OUTPATIENT)
Dept: FAMILY MEDICINE CLINIC | Facility: CLINIC | Age: 43
End: 2023-04-17

## 2023-04-17 VITALS
DIASTOLIC BLOOD PRESSURE: 70 MMHG | HEIGHT: 73 IN | HEART RATE: 80 BPM | SYSTOLIC BLOOD PRESSURE: 124 MMHG | BODY MASS INDEX: 32.34 KG/M2 | TEMPERATURE: 97 F | OXYGEN SATURATION: 97 % | WEIGHT: 244 LBS

## 2023-04-17 DIAGNOSIS — Z00.00 ENCOUNTER FOR ANNUAL PHYSICAL EXAM: ICD-10-CM

## 2023-04-17 DIAGNOSIS — F32.A ANXIETY AND DEPRESSION: ICD-10-CM

## 2023-04-17 DIAGNOSIS — R10.11 RUQ PAIN: ICD-10-CM

## 2023-04-17 DIAGNOSIS — F41.9 ANXIETY AND DEPRESSION: ICD-10-CM

## 2023-04-17 DIAGNOSIS — Z00.00 ENCOUNTER FOR ANNUAL PHYSICAL EXAM: Primary | ICD-10-CM

## 2023-04-17 LAB
ALBUMIN SERPL-MCNC: 3.8 G/DL (ref 3.4–5)
ALBUMIN/GLOB SERPL: 1.1 {RATIO} (ref 1–2)
ALP LIVER SERPL-CCNC: 80 U/L
ALT SERPL-CCNC: 32 U/L
ANION GAP SERPL CALC-SCNC: 2 MMOL/L (ref 0–18)
AST SERPL-CCNC: 20 U/L (ref 15–37)
BASOPHILS # BLD AUTO: 0.03 X10(3) UL (ref 0–0.2)
BASOPHILS NFR BLD AUTO: 0.4 %
BILIRUB SERPL-MCNC: 0.5 MG/DL (ref 0.1–2)
BUN BLD-MCNC: 10 MG/DL (ref 7–18)
BUN/CREAT SERPL: 8.1 (ref 10–20)
CALCIUM BLD-MCNC: 8.9 MG/DL (ref 8.5–10.1)
CHLORIDE SERPL-SCNC: 109 MMOL/L (ref 98–112)
CHOLEST SERPL-MCNC: 167 MG/DL (ref ?–200)
CO2 SERPL-SCNC: 30 MMOL/L (ref 21–32)
CREAT BLD-MCNC: 1.23 MG/DL
DEPRECATED RDW RBC AUTO: 40.4 FL (ref 35.1–46.3)
EOSINOPHIL # BLD AUTO: 0.13 X10(3) UL (ref 0–0.7)
EOSINOPHIL NFR BLD AUTO: 1.6 %
ERYTHROCYTE [DISTWIDTH] IN BLOOD BY AUTOMATED COUNT: 11.6 % (ref 11–15)
FASTING PATIENT LIPID ANSWER: NO
FASTING STATUS PATIENT QL REPORTED: NO
GFR SERPLBLD BASED ON 1.73 SQ M-ARVRAT: 75 ML/MIN/1.73M2 (ref 60–?)
GLOBULIN PLAS-MCNC: 3.4 G/DL (ref 2.8–4.4)
GLUCOSE BLD-MCNC: 87 MG/DL (ref 70–99)
HCT VFR BLD AUTO: 42.8 %
HDLC SERPL-MCNC: 42 MG/DL (ref 40–59)
HGB BLD-MCNC: 13.4 G/DL
IMM GRANULOCYTES # BLD AUTO: 0.03 X10(3) UL (ref 0–1)
IMM GRANULOCYTES NFR BLD: 0.4 %
LDLC SERPL CALC-MCNC: 89 MG/DL (ref ?–100)
LYMPHOCYTES # BLD AUTO: 2.51 X10(3) UL (ref 1–4)
LYMPHOCYTES NFR BLD AUTO: 31.6 %
MCH RBC QN AUTO: 29.6 PG (ref 26–34)
MCHC RBC AUTO-ENTMCNC: 31.3 G/DL (ref 31–37)
MCV RBC AUTO: 94.7 FL
MONOCYTES # BLD AUTO: 0.72 X10(3) UL (ref 0.1–1)
MONOCYTES NFR BLD AUTO: 9.1 %
NEUTROPHILS # BLD AUTO: 4.52 X10 (3) UL (ref 1.5–7.7)
NEUTROPHILS # BLD AUTO: 4.52 X10(3) UL (ref 1.5–7.7)
NEUTROPHILS NFR BLD AUTO: 56.9 %
NONHDLC SERPL-MCNC: 125 MG/DL (ref ?–130)
OSMOLALITY SERPL CALC.SUM OF ELEC: 290 MOSM/KG (ref 275–295)
PLATELET # BLD AUTO: 195 10(3)UL (ref 150–450)
POTASSIUM SERPL-SCNC: 4.1 MMOL/L (ref 3.5–5.1)
PROT SERPL-MCNC: 7.2 G/DL (ref 6.4–8.2)
RBC # BLD AUTO: 4.52 X10(6)UL
SODIUM SERPL-SCNC: 141 MMOL/L (ref 136–145)
TRIGL SERPL-MCNC: 213 MG/DL (ref 30–149)
TSI SER-ACNC: 0.99 MIU/ML (ref 0.36–3.74)
VLDLC SERPL CALC-MCNC: 35 MG/DL (ref 0–30)
WBC # BLD AUTO: 7.9 X10(3) UL (ref 4–11)

## 2023-04-17 PROCEDURE — 84443 ASSAY THYROID STIM HORMONE: CPT

## 2023-04-17 PROCEDURE — 80061 LIPID PANEL: CPT

## 2023-04-17 PROCEDURE — 36415 COLL VENOUS BLD VENIPUNCTURE: CPT

## 2023-04-17 PROCEDURE — 85025 COMPLETE CBC W/AUTO DIFF WBC: CPT

## 2023-04-17 PROCEDURE — 80053 COMPREHEN METABOLIC PANEL: CPT

## 2023-04-17 NOTE — PATIENT INSTRUCTIONS
Right sided pain  - Stretch  - Ice 20 minutes several times per day  - Can try taking ibuprofen 600 mg every 6 hours as needed for pain. Take with food. - Limit fried, greasy, spicy, citrus foods. Avoid high amounts of caffeine.

## 2023-06-07 ENCOUNTER — OFFICE VISIT (OUTPATIENT)
Dept: AUDIOLOGY | Facility: CLINIC | Age: 43
End: 2023-06-07

## 2023-06-07 DIAGNOSIS — H90.3 ASYMMETRICAL SENSORINEURAL HEARING LOSS: Primary | ICD-10-CM

## 2023-06-07 PROCEDURE — 92567 TYMPANOMETRY: CPT | Performed by: AUDIOLOGIST

## 2023-06-07 PROCEDURE — 92557 COMPREHENSIVE HEARING TEST: CPT | Performed by: AUDIOLOGIST

## 2023-08-17 ENCOUNTER — OFFICE VISIT (OUTPATIENT)
Dept: FAMILY MEDICINE CLINIC | Facility: CLINIC | Age: 43
End: 2023-08-17

## 2023-08-17 VITALS
HEART RATE: 76 BPM | TEMPERATURE: 98 F | HEIGHT: 73 IN | DIASTOLIC BLOOD PRESSURE: 64 MMHG | SYSTOLIC BLOOD PRESSURE: 98 MMHG | WEIGHT: 248.13 LBS | BODY MASS INDEX: 32.89 KG/M2

## 2023-08-17 DIAGNOSIS — Z28.21 TETANUS, DIPHTHERIA, AND ACELLULAR PERTUSSIS (TDAP) VACCINATION DECLINED: ICD-10-CM

## 2023-08-17 DIAGNOSIS — Z00.00 HEALTHY ADULT ON ROUTINE PHYSICAL EXAMINATION: Primary | ICD-10-CM

## 2023-08-17 DIAGNOSIS — R45.86 MOOD CHANGES: ICD-10-CM

## 2023-08-17 PROCEDURE — 99396 PREV VISIT EST AGE 40-64: CPT | Performed by: FAMILY MEDICINE

## 2023-08-17 PROCEDURE — 3074F SYST BP LT 130 MM HG: CPT | Performed by: FAMILY MEDICINE

## 2023-08-17 PROCEDURE — 3078F DIAST BP <80 MM HG: CPT | Performed by: FAMILY MEDICINE

## 2023-08-17 PROCEDURE — 3008F BODY MASS INDEX DOCD: CPT | Performed by: FAMILY MEDICINE

## 2023-08-17 NOTE — PATIENT INSTRUCTIONS
All adult screening ordered and done appropriate for patient's age and gender and risk factors and complaints. Encouraged physical fitness and daily physical activity daily. Monitor blood pressures and record at home. Limit salt intake. Consider counseling and may need medication for anxious moments and/or sleep.

## 2024-02-21 ENCOUNTER — HOSPITAL ENCOUNTER (OUTPATIENT)
Age: 44
Discharge: HOME | End: 2024-02-21
Payer: COMMERCIAL

## 2024-02-21 VITALS
HEART RATE: 83 BPM | DIASTOLIC BLOOD PRESSURE: 67 MMHG | OXYGEN SATURATION: 97 % | TEMPERATURE: 98 F | SYSTOLIC BLOOD PRESSURE: 112 MMHG | RESPIRATION RATE: 14 BRPM

## 2024-02-21 DIAGNOSIS — U07.1 COVID-19: Primary | ICD-10-CM

## 2024-02-21 DIAGNOSIS — R53.83 FATIGUE: ICD-10-CM

## 2024-02-21 LAB — SARS-COV-2 RNA RESP QL NAA+PROBE: DETECTED

## 2024-02-21 PROCEDURE — U0002 COVID-19 LAB TEST NON-CDC: HCPCS | Performed by: NURSE PRACTITIONER

## 2024-02-21 PROCEDURE — 99213 OFFICE O/P EST LOW 20 MIN: CPT | Performed by: NURSE PRACTITIONER

## 2024-02-21 NOTE — DISCHARGE INSTRUCTIONS
You tested positive for COVID 19 today  Please quarantine for 5 days, beginning tomorrow. After the 5 days, you can break quarantine as long as you can wear a mask at all times for an additional 5 days  For chest pain, shortness of breath or worsening symptoms, please go to ER    Please drink plenty of fluids  Steam showers  Take ibuprofen (Motrin, Advil) 600 mg every 6 hours for fever/aches, take with food  OR  Acetaminophen (Tylenol) 650-1000 mg every 6 hours for fever/aches  Rest!  Mucinex DM twice per day for 7 days, with plenty of fluids  For chest pain, shortness of breath or worsening symptoms, please go to ER  Please see your primary care provider if no improvement in 5-7 days

## 2024-02-21 NOTE — ED PROVIDER NOTES
Chief Complaint   Patient presents with    Sore Throat       HPI:     Joan Crocker is a 43 year old male with enlarged prostate who presents for evaluation and management of a chief complaint of runny nose, sore throat, cough, headache that started yesterday. No fever, chest pain, shortness of breath, nausea, vomiting, diarrhea or abdominal pain. Son at home has COVID.    PFSH  PFSH asessment screens reviewed and agree.  Nursing note reviewed and I agree with documentation.    Family History   Problem Relation Age of Onset    Diabetes Father     Prostate Cancer Father     Heart Disorder Father 80        CHF     Family history reviewed with patient/caregiver and is not pertinent to presenting problem.  Social History     Socioeconomic History    Marital status:      Spouse name: Not on file    Number of children: 3    Years of education: Not on file    Highest education level: Not on file   Occupational History    Occupation: [employer]   Tobacco Use    Smoking status: Never    Smokeless tobacco: Never   Vaping Use    Vaping Use: Never used   Substance and Sexual Activity    Alcohol use: No    Drug use: Not Currently    Sexual activity: Not on file   Other Topics Concern     Service Not Asked    Blood Transfusions Not Asked    Caffeine Concern No    Occupational Exposure Not Asked    Hobby Hazards Not Asked    Sleep Concern Not Asked    Stress Concern Not Asked    Weight Concern Not Asked    Special Diet Not Asked    Back Care Not Asked    Exercise Not Asked    Bike Helmet Not Asked    Seat Belt Not Asked    Self-Exams Not Asked   Social History Narrative    Not on file     Social Determinants of Health     Financial Resource Strain: Not on file   Food Insecurity: Not on file   Transportation Needs: Not on file   Physical Activity: Not on file   Stress: Not on file   Social Connections: Not on file   Housing Stability: Not on file        Findings:    /67   Pulse 83   Temp 97.8 °F  (36.6 °C) (Temporal)   Resp 14   SpO2 97%   GENERAL: well developed, well nourished, well hydrated, no distress  HEAD: normocephalic  NECK: supple, no adenopathy  EYES: sclera non icteric bilateral, conjunctiva clear  EARS: TM  bilateral: normal  NOSE: nasal turbinates: swollen, red, and clear drainage  THROAT: clear, without exudates  CARDIO: RRR without murmur  LUNGS: clear to auscultation bilaterally; no rales, rhonchi, or wheezes  SKIN: good skin turgor, no obvious rashes    MDM/Assessment/Plan:   Orders for this encounter:  Orders Placed This Encounter    Rapid SARS-CoV-2 by PCR     Order Specific Question:   Release to patient     Answer:   Immediate       Labs performed this visit:  Recent Results (from the past 10 hour(s))   Rapid SARS-CoV-2 by PCR    Collection Time: 02/21/24  9:28 AM    Specimen: Nares; Other   Result Value Ref Range    Rapid SARS-CoV-2 by PCR Detected (A) Not Detected       MDM:   Medical Decision Making  Differentials include: Viral URI, covid, pneumonia    HPI and exam consistent with COVID. Patient declined paxlovid. Lungs are clear today, low suspicion for pneumonia.  Discussed supportive care including tylenol, ibuprofen, Mucinex DM twice per day, fluids, steam showers.  Return precautions were discussed.  Advised follow-up with primary care provider in 5-7 days if no improvement.  Patient verbalized understanding.     Amount and/or Complexity of Data Reviewed  Labs: ordered. Decision-making details documented in ED Course.     Details: Covid positive     Risk  OTC drugs.          Diagnosis:    ICD-10-CM    1. COVID-19  U07.1       2. Fatigue  R53.83 Rapid SARS-CoV-2 by PCR     Rapid SARS-CoV-2 by PCR          All results reviewed and discussed with patient.  See AVS for detailed discharge instructions for your condition today.    Follow Up with:  No follow-up provider specified.

## 2024-06-20 ENCOUNTER — TELEPHONE (OUTPATIENT)
Dept: FAMILY MEDICINE CLINIC | Facility: CLINIC | Age: 44
End: 2024-06-20

## 2024-06-20 DIAGNOSIS — Z12.11 ENCOUNTER FOR SCREENING COLONOSCOPY: Primary | ICD-10-CM

## 2024-08-27 ENCOUNTER — OFFICE VISIT (OUTPATIENT)
Dept: GASTROENTEROLOGY | Facility: CLINIC | Age: 44
End: 2024-08-27

## 2024-08-27 ENCOUNTER — HOSPITAL ENCOUNTER (OUTPATIENT)
Age: 44
Discharge: HOME | End: 2024-08-27
Payer: COMMERCIAL

## 2024-08-27 ENCOUNTER — TELEPHONE (OUTPATIENT)
Dept: GASTROENTEROLOGY | Facility: CLINIC | Age: 44
End: 2024-08-27

## 2024-08-27 VITALS
SYSTOLIC BLOOD PRESSURE: 129 MMHG | DIASTOLIC BLOOD PRESSURE: 67 MMHG | RESPIRATION RATE: 16 BRPM | TEMPERATURE: 98 F | OXYGEN SATURATION: 97 % | HEART RATE: 74 BPM

## 2024-08-27 VITALS
WEIGHT: 248 LBS | SYSTOLIC BLOOD PRESSURE: 120 MMHG | HEART RATE: 84 BPM | BODY MASS INDEX: 32.87 KG/M2 | DIASTOLIC BLOOD PRESSURE: 73 MMHG | HEIGHT: 73 IN

## 2024-08-27 DIAGNOSIS — H60.11 CELLULITIS OF AURICLE OF RIGHT EAR: Primary | ICD-10-CM

## 2024-08-27 DIAGNOSIS — Z12.11 COLON CANCER SCREENING: Primary | ICD-10-CM

## 2024-08-27 DIAGNOSIS — Z12.11 ENCOUNTER FOR SCREENING COLONOSCOPY: ICD-10-CM

## 2024-08-27 DIAGNOSIS — Z12.11 SPECIAL SCREENING FOR MALIGNANT NEOPLASMS, COLON: Primary | ICD-10-CM

## 2024-08-27 PROCEDURE — 3008F BODY MASS INDEX DOCD: CPT | Performed by: INTERNAL MEDICINE

## 2024-08-27 PROCEDURE — S0285 CNSLT BEFORE SCREEN COLONOSC: HCPCS | Performed by: INTERNAL MEDICINE

## 2024-08-27 PROCEDURE — 3074F SYST BP LT 130 MM HG: CPT | Performed by: INTERNAL MEDICINE

## 2024-08-27 PROCEDURE — 99213 OFFICE O/P EST LOW 20 MIN: CPT

## 2024-08-27 PROCEDURE — 3078F DIAST BP <80 MM HG: CPT | Performed by: INTERNAL MEDICINE

## 2024-08-27 RX ORDER — SODIUM, POTASSIUM,MAG SULFATES 17.5-3.13G
SOLUTION, RECONSTITUTED, ORAL ORAL
Qty: 1 EACH | Refills: 0 | Status: SHIPPED | OUTPATIENT
Start: 2024-08-27

## 2024-08-27 RX ORDER — CEFADROXIL 500 MG/1
500 CAPSULE ORAL 2 TIMES DAILY
Qty: 20 CAPSULE | Refills: 0 | Status: SHIPPED | OUTPATIENT
Start: 2024-08-27 | End: 2024-09-06

## 2024-08-27 RX ORDER — TRIAMCINOLONE ACETONIDE 1 MG/G
OINTMENT TOPICAL
Qty: 80 G | Refills: 0 | Status: SHIPPED | OUTPATIENT
Start: 2024-08-27

## 2024-08-27 NOTE — H&P
[facility] - Gastroenterology                                                                                                               Reason for consult: CRC screening     Requesting physician or provider: David Kirkland DO    Chief Complaint   Patient presents with    Colonoscopy Screening       HPI:   Joan Crocker is a 44 year old year-old male here for the following:    He is here to discuss options for colon cancer screening.      Pt went to urgent care for R ear swelling earlier today after a bug bite and was advised to start abx for a cellulitis.     Patient currently denies any GI symptoms of nausea, vomiting, dyspepsia, dysphagia, hematemesis, abdominal pain, change in bowel habits, thin stools, hematochezia, or melena.  Additionally there is no weight loss and no reported history of chest pain or shortness of breath.    Denies family h/o CRC.     Prior endoscopies:  None.     Soc:  -denies smoking  -denies heavy Etoh  -no illicit drug use    Wt Readings from Last 6 Encounters:   08/27/24 248 lb (112.5 kg)   08/17/23 248 lb 2 oz (112.5 kg)   04/17/23 244 lb (110.7 kg)   03/30/23 247 lb (112 kg)   06/29/22 240 lb (108.9 kg)   10/07/21 236 lb (107 kg)        History, Medications, Allergies, ROS:      Past Medical History:    Enlarged prostate    Varicocele    [LATERALITY NOT STATED]      Past Surgical History:   Procedure Laterality Date    Other surgical history  2013    office cystoscopy      Family Hx:   Family History   Problem Relation Age of Onset    Cancer Father         prostate cancer    Diabetes Father     Prostate Cancer Father     Heart Disorder Father 80        CHF      Social History:   Social History     Socioeconomic History    Marital status:     Number of children: 3   Occupational History    Occupation: [employer]   Tobacco Use    Smoking status: Never    Smokeless  tobacco: Never   Vaping Use    Vaping status: Never Used   Substance and Sexual Activity    Alcohol use: No    Drug use: Not Currently   Other Topics Concern    Caffeine Concern No     Social Determinants of Health      [facility]    Social Connections        Medications (Active prior to today's visit):  Current Outpatient Medications   Medication Sig Dispense Refill    Na Sulfate-K Sulfate-Mg Sulf (SUPREP BOWEL PREP KIT) 17.5-3.13-1.6 GM/177ML Oral Solution Take as directed 1 each 0    polyethylene glycol, PEG 3350-KCl-NaBcb-NaCl-NaSulf, 236 g Oral Recon Soln Take 4,000 mL by mouth once for 1 dose. As directed by GI clinic instructions. 4000 mL 0    cefadroxil 500 MG Oral Cap Take 1 capsule (500 mg total) by mouth 2 (two) times daily for 10 days. 20 capsule 0    triamcinolone 0.1 % External Ointment Apply to affected area twice a day until rash resolves 80 g 0    Tadalafil 20 MG Oral Tab Take 1 tablet (20 mg total) by mouth daily as needed. (Patient not taking: Reported on 3/30/2023) 8 tablet 2       Allergies:  No Known Allergies    ROS:   CONSTITUTIONAL:  negative for fevers, rigors  EYES:  negative for diplopia   RESPIRATORY:  negative for severe shortness of breath  CARDIOVASCULAR:  negative for crushing sub-sternal chest pain  GASTROINTESTINAL:  see HPI  GENITOURINARY:  negative for dysuria or gross hematuria  INTEGUMENT/BREAST:  SKIN:  negative for jaundice   ALLERGIC/IMMUNOLOGIC:  negative for hay fever  ENDOCRINE:  negative for cold intolerance and heat intolerance  MUSCULOSKELETAL:  negative for joint effusion/severe erythema  BEHAVIOR/PSYCH:  negative for psychotic behavior      PHYSICAL EXAM:   Blood pressure 120/73, pulse 84, height 6' 1\" (1.854 m), weight 248 lb (112.5 kg).    GEN - Patient appears comfortable and in no acute discomfort  ENT - MMM, R ear swelling noted  EYES - the sclera appears anicteric  CV - no edema  RESP -  No  increased work of breathing  ABDOMEN - soft, non-tender exam in all quadrants without rigidity or guarding, non-distended, no abnormal bowel sounds noted, no masses are palpated  SKIN - No jaundice  NEURO - Alert and appropriate, and gross movements of extremities normal  PSYCH - normal affect, non-agitated      Labs/Imaging:     Patient's pertinent labs and imaging were reviewed and discussed with patient today.      .  ASSESSMENT/PLAN:     44 M here for the following:    Average risk CRC screening: the patient is considered average risk for colon cancer, and it is appropriate to proceed with a screening colonoscopy. The patient is currently asymptomatic and denies diarrhea, hematochezia, thin-stools or weight loss. We discussed the risks/benefits/alternatives to the procedure, including CT colonography and stool testing, and he would like to proceed with a colonoscopy.    R ear swelling - pt recommended to start abx by his provider in urgent care.     Recommend:  -Schedule colonoscopy w/ MAC  -Prep: dose Suprep or Golytely    Colonoscopy consent: I have discussed the risks, benefits, and alternatives to colonoscopy with the patient [who demonstrated understanding], including but not limited to the risks of bleeding, infection, pain, death, as well as the risks of anesthesia and perforation all leading to prolonged hospitalization, surgical intervention, or even death. I also specifically mentioned the miss rate of colonoscopy of 5-10% in the best of all circumstances. All questions were answered to the patient’s satisfaction. The patient signed informed consent and elected to proceed with colonoscopy with intervention [i.e. polypectomy, stent placement, etc.] as indicated.          Orders This Visit:  No orders of the defined types were placed in this encounter.      Meds This Visit:  Requested Prescriptions     Signed Prescriptions Disp Refills    Na Sulfate-K Sulfate-Mg Sulf (SUPREP BOWEL PREP KIT) 17.5-3.13-1.6  GM/177ML Oral Solution 1 each 0     Sig: Take as directed    polyethylene glycol, PEG 3350-KCl-NaBcb-NaCl-NaSulf, 236 g Oral Recon Soln 4000 mL 0     Sig: Take 4,000 mL by mouth once for 1 dose. As directed by GI clinic instructions.       Imaging & Referrals:  OP REFERRAL TO [facility] GI TELEPHONE COLON SCREEN         Giovanna Leonard MD          This note was partially prepared using Dragon Medical voice recognition dictation software. As a result, errors may occur. When identified, these errors have been corrected. While every attempt is made to correct errors during dictation, discrepancies may still exist.

## 2024-08-27 NOTE — ED PROVIDER NOTES
Patient Seen in: [facility]      History     Chief Complaint   Patient presents with    Skin Problem     Stated Complaint: Ear Swelling  Subjective:   Joan is a 44-year-old male presenting to the immediate care complaining of itching and swelling to his right ear.  Patient states that he got a couple of mosquito bites a few days ago.  He had 1 on his right cheek that seems to be resolved.  Believes he got a mosquito bite on his right ear which is now causing redness and swelling.  States the ear is very itchy.  There is no pain.  Denies any pain inside the ear.  Denies any tooth or mouth pain.  Patient denies any injury or trauma.  He has not had a fever.  He is otherwise feeling well.  No other concerns or complaints.        Objective:   Past Medical History:    Enlarged prostate    Varicocele    [LATERALITY NOT STATED]            Past Surgical History:   Procedure Laterality Date    Other surgical history  2013    office cystoscopy              Social History     Socioeconomic History    Marital status:     Number of children: 3   Occupational History    Occupation: [employer]   Tobacco Use    Smoking status: Never    Smokeless tobacco: Never   Vaping Use    Vaping status: Never Used   Substance and Sexual Activity    Alcohol use: No    Drug use: Not Currently   Other Topics Concern    Caffeine Concern No     Social Determinants of Health      [facility]    Social Connections            Review of Systems    Positive for stated complaint: Skin Problem    Other systems are as noted in HPI.  Constitutional and vital signs reviewed.      All other systems reviewed and negative except as noted above.    Physical Exam     ED Triage Vitals [08/27/24 1202]   /67   Pulse 74   Resp 16   Temp 98.1 °F (36.7 °C)   Temp src Temporal   SpO2 97 %   O2 Device None (Room air)     Current:/67   Pulse 74   Temp 98.1 °F  (36.7 °C) (Temporal)   Resp 16   SpO2 97%     Physical Exam  Vitals and nursing note reviewed.   Constitutional:       General: He is not in acute distress.     Appearance: Normal appearance. He is not ill-appearing, toxic-appearing or diaphoretic.   HENT:      Head: Normocephalic.      Right Ear: Tympanic membrane and ear canal normal. Swelling present. No drainage or tenderness. No middle ear effusion. No foreign body. No mastoid tenderness.      Left Ear: Tympanic membrane, ear canal and external ear normal.      Ears:      Comments: There is erythema and swelling to the entire right auricle.  Ear canal is normal.  TM is normal.  There is no drainage.  No tragus tenderness or mastoid tenderness.     Mouth/Throat:      Dentition: Normal dentition. No dental tenderness, gingival swelling or dental caries.   Cardiovascular:      Rate and Rhythm: Normal rate.   Pulmonary:      Effort: Pulmonary effort is normal.   Musculoskeletal:         General: Normal range of motion.      Cervical back: Normal range of motion.   Skin:     General: Skin is warm and dry.      Capillary Refill: Capillary refill takes less than 2 seconds.   Neurological:      General: No focal deficit present.      Mental Status: He is alert and oriented to person, place, and time.   Psychiatric:         Mood and Affect: Mood normal.         Behavior: Behavior normal.         Thought Content: Thought content normal.         Judgment: Judgment normal.         ED Course   Radiology:  No results found.  Labs Reviewed - No data to display    MDM     Medical Decision Making  Differential diagnoses reflecting the complexity of care include but are not limited to cellulitis, trauma, local insect bite reaction.    Comorbidities that add complexity to management include: None  History obtained by an independent source was from: Patient  Patient is well appearing, non-toxic and in no acute distress.  Vital signs are stable.   History and physical exam are  consistent with cellulitis to the right auricle.  There is no otitis media or otitis externa.  No tragus tenderness.  No mastoid tenderness.  Patient denied any trauma.  Sent a prescription for cefadroxil for cellulitis.  Recommended that if patient does not see any improvement in symptoms within the next 3 to 4 days they should see ear nose and throat specialist. Recommended that if patient has any worsening symptoms including worsening redness or swelling, fever, red streaking, chest pain or shortness of breath or any other concerning complaints they should go to the emergency department.  Also recommended Tylenol or Motrin for pain.    Recommended that patient wash the area 2-3 times a day with plain soap and water.  Sent a prescription for triamcinolone to be placed on the area twice a day for itching.  Recommended the patient make an appointment to see primary care doctor next week for a wound check.    ED precautions discussed.  Patient (guardian) advised to follow up with PCP in 2-3 days.  Patient (guardian) agrees with this plan of care.  Patient (guardian) verbalizes understanding of discharge instructions and plan of care.      Risk  OTC drugs.  Prescription drug management.        Disposition and Plan     Clinical Impression:  1. Cellulitis of auricle of right ear         Disposition:  Discharge  8/27/2024 12:17 pm    Follow-up:  Vito Graham DO  [address] [address]  [phone]          David Kirkland,   [address] [place] [address]  [phone]                Medications Prescribed:  Discharge Medication List as of 8/27/2024 12:18 PM        START taking these medications    Details   cefadroxil 500 MG Oral Cap Take 1 capsule (500 mg total) by mouth 2 (two) times daily for 10 days., Normal, Disp-20 capsule, R-0      triamcinolone 0.1 % External Ointment Apply to affected area twice a day until rash resolves, Normal, Disp-80 g, R-0

## 2024-08-27 NOTE — ED INITIAL ASSESSMENT (HPI)
Pt c/o bug bite to R side face and R ear yesterday.  States feels R ear with itching and swelling.  No fever.

## 2024-08-27 NOTE — PATIENT INSTRUCTIONS
1. Schedule colonoscopy with MAC [Diagnosis: CRC screening]    2.  bowel prep from pharmacy (split dose Suprep or Golytely)    3. Continue all medications for procedure    4. Read all bowel prep instructions carefully    5. AVOID seeds, nuts, popcorn, raw fruits and vegetables (cooked is okay) for 3 days before procedure    6. If you start any NEW medication after your visit today, please notify us. Certain medications will need to be held before the procedure, or the procedure cannot be performed.

## 2024-08-27 NOTE — DISCHARGE INSTRUCTIONS
You have a skin infection on your right outer ear.    Please take the antibiotics as prescribed.    I also sent you a prescription for triamcinolone ointment to be placed on the ear twice a day for the itching.  You can also use Claritin or Zyrtec in the morning to help with the itching.  If you do not see any improvement in your symptoms within the next 3 to 4 days you should see the ear, nose and throat specialist.    If you have any worsening symptoms including worsening redness or swelling, fever, red streaking, chest pain or shortness of breath or any other concerning complaints you should go to the emergency department.  You can also take Tylenol or Motrin for pain.    Please wash the area 2-3 times a day with plain soap and water.    Please make an appointment to see your primary care doctor next week for a wound check.

## 2024-08-27 NOTE — TELEPHONE ENCOUNTER
Schedulers, see providers orders below:    Patient was provided with written and verbal procedure prep instructions, including any medication adjustments.   Patient was advised to call medical insurance for any questions on benefits and/or any out of pocket costs.   Patient is aware that the GI schedulers will be calling to schedule procedure(s) and that providers may not have any availability until possibly end of the year.         Instructions and Information about Your Health    1. Schedule colonoscopy with MAC [Diagnosis: CRC screening]     2.  bowel prep from pharmacy (split dose Suprep or Golytely)     3. Continue all medications for procedure     4. Read all bowel prep instructions carefully     5. AVOID seeds, nuts, popcorn, raw fruits and vegetables (cooked is okay) for 3 days before procedure     6. If you start any NEW medication after your visit today, please notify us. Certain medications will need to be held before the procedure, or the procedure cannot be performed.

## 2024-08-28 NOTE — TELEPHONE ENCOUNTER
Scheduled for:  Colonoscopy 32130    Provider Name:  Dr Leonard    Date:  1/20/2025    Location:   [facility]     Sedation:  MAC    Time:  11:15 am (Patient made aware [facility] will call the day before with an arrival time)    Prep:  Suprep    Meds/Allergies Reconciled?:  Physician reviewed     Diagnosis with codes:    Special screening for malignant neoplasms, colon [Z12.11]    Was patient informed to call insurance with codes (Y/N):  Yes, [facility] with the [facility].     Referral sent?:  N/A    [facility] or [facility] notified?:  I sent an electronic request to Endo Scheduling and received a confirmation today.      Medication Orders:  This patient verbally confirmed that he is not taking:   Iron, blood thinners, BP meds, and is not diabetic   Not latex allergy, Not PCN allergy and does not have a pacemaker     Misc Orders:  N/a     Further instructions given by staff:   I discussed the prep instructions with the patient which he verbally understood and is aware that I will send the instructions today via [place]

## 2024-11-11 ENCOUNTER — LAB ENCOUNTER (OUTPATIENT)
Dept: LAB | Age: 44
End: 2024-11-11
Attending: FAMILY MEDICINE
Payer: COMMERCIAL

## 2024-11-11 ENCOUNTER — OFFICE VISIT (OUTPATIENT)
Dept: FAMILY MEDICINE CLINIC | Facility: CLINIC | Age: 44
End: 2024-11-11

## 2024-11-11 VITALS
RESPIRATION RATE: 18 BRPM | DIASTOLIC BLOOD PRESSURE: 76 MMHG | BODY MASS INDEX: 32.74 KG/M2 | WEIGHT: 247 LBS | HEIGHT: 73 IN | OXYGEN SATURATION: 95 % | HEART RATE: 95 BPM | TEMPERATURE: 98 F | SYSTOLIC BLOOD PRESSURE: 116 MMHG

## 2024-11-11 DIAGNOSIS — Z28.21 TETANUS, DIPHTHERIA, AND ACELLULAR PERTUSSIS (TDAP) VACCINATION DECLINED: Primary | ICD-10-CM

## 2024-11-11 DIAGNOSIS — R07.9 CHEST PAIN, UNSPECIFIED TYPE: ICD-10-CM

## 2024-11-11 DIAGNOSIS — Z12.11 COLON CANCER SCREENING: ICD-10-CM

## 2024-11-11 DIAGNOSIS — Z00.00 ROUTINE PHYSICAL EXAMINATION: ICD-10-CM

## 2024-11-11 DIAGNOSIS — Z28.21 INFLUENZA VACCINATION DECLINED BY PATIENT: ICD-10-CM

## 2024-11-11 DIAGNOSIS — R10.13 EPIGASTRIC PAIN: ICD-10-CM

## 2024-11-11 LAB
ALBUMIN SERPL-MCNC: 4.4 G/DL (ref 3.2–4.8)
ALBUMIN/GLOB SERPL: 1.6 {RATIO} (ref 1–2)
ALP LIVER SERPL-CCNC: 75 U/L
ALT SERPL-CCNC: 25 U/L
ANION GAP SERPL CALC-SCNC: 5 MMOL/L (ref 0–18)
AST SERPL-CCNC: 23 U/L (ref ?–34)
BASOPHILS # BLD AUTO: 0.02 X10(3) UL (ref 0–0.2)
BASOPHILS NFR BLD AUTO: 0.3 %
BILIRUB SERPL-MCNC: 0.5 MG/DL (ref 0.3–1.2)
BILIRUB UR QL: NEGATIVE
BUN BLD-MCNC: 10 MG/DL (ref 9–23)
BUN/CREAT SERPL: 8.9 (ref 10–20)
CALCIUM BLD-MCNC: 9.6 MG/DL (ref 8.7–10.4)
CHLORIDE SERPL-SCNC: 110 MMOL/L (ref 98–112)
CHOLEST SERPL-MCNC: 149 MG/DL (ref ?–200)
CLARITY UR: CLEAR
CO2 SERPL-SCNC: 29 MMOL/L (ref 21–32)
COLOR UR: YELLOW
COMPLEXED PSA SERPL-MCNC: 0.45 NG/ML (ref ?–4)
CREAT BLD-MCNC: 1.12 MG/DL
DEPRECATED RDW RBC AUTO: 41.4 FL (ref 35.1–46.3)
EGFRCR SERPLBLD CKD-EPI 2021: 83 ML/MIN/1.73M2 (ref 60–?)
EOSINOPHIL # BLD AUTO: 0.14 X10(3) UL (ref 0–0.7)
EOSINOPHIL NFR BLD AUTO: 1.8 %
ERYTHROCYTE [DISTWIDTH] IN BLOOD BY AUTOMATED COUNT: 11.7 % (ref 11–15)
FASTING PATIENT LIPID ANSWER: NO
FASTING STATUS PATIENT QL REPORTED: NO
GLOBULIN PLAS-MCNC: 2.8 G/DL (ref 2–3.5)
GLUCOSE BLD-MCNC: 96 MG/DL (ref 70–99)
GLUCOSE UR-MCNC: NORMAL MG/DL
HCT VFR BLD AUTO: 41.3 %
HDLC SERPL-MCNC: 35 MG/DL (ref 40–59)
HGB BLD-MCNC: 13.2 G/DL
HGB UR QL STRIP.AUTO: NEGATIVE
IMM GRANULOCYTES # BLD AUTO: 0.02 X10(3) UL (ref 0–1)
IMM GRANULOCYTES NFR BLD: 0.3 %
KETONES UR-MCNC: NEGATIVE MG/DL
LDLC SERPL CALC-MCNC: 81 MG/DL (ref ?–100)
LEUKOCYTE ESTERASE UR QL STRIP.AUTO: NEGATIVE
LYMPHOCYTES # BLD AUTO: 2.15 X10(3) UL (ref 1–4)
LYMPHOCYTES NFR BLD AUTO: 27.9 %
MCH RBC QN AUTO: 30.6 PG (ref 26–34)
MCHC RBC AUTO-ENTMCNC: 32 G/DL (ref 31–37)
MCV RBC AUTO: 95.8 FL
MONOCYTES # BLD AUTO: 0.73 X10(3) UL (ref 0.1–1)
MONOCYTES NFR BLD AUTO: 9.5 %
NEUTROPHILS # BLD AUTO: 4.65 X10 (3) UL (ref 1.5–7.7)
NEUTROPHILS # BLD AUTO: 4.65 X10(3) UL (ref 1.5–7.7)
NEUTROPHILS NFR BLD AUTO: 60.2 %
NITRITE UR QL STRIP.AUTO: NEGATIVE
NONHDLC SERPL-MCNC: 114 MG/DL (ref ?–130)
OSMOLALITY SERPL CALC.SUM OF ELEC: 297 MOSM/KG (ref 275–295)
PH UR: 7.5 [PH] (ref 5–8)
PLATELET # BLD AUTO: 210 10(3)UL (ref 150–450)
POTASSIUM SERPL-SCNC: 4.5 MMOL/L (ref 3.5–5.1)
PROT SERPL-MCNC: 7.2 G/DL (ref 5.7–8.2)
RBC # BLD AUTO: 4.31 X10(6)UL
SODIUM SERPL-SCNC: 144 MMOL/L (ref 136–145)
SP GR UR STRIP: 1.02 (ref 1–1.03)
TRIGL SERPL-MCNC: 196 MG/DL (ref 30–149)
TSI SER-ACNC: 0.9 UIU/ML (ref 0.55–4.78)
UROBILINOGEN UR STRIP-ACNC: 2
VLDLC SERPL CALC-MCNC: 31 MG/DL (ref 0–30)
WBC # BLD AUTO: 7.7 X10(3) UL (ref 4–11)

## 2024-11-11 PROCEDURE — 3078F DIAST BP <80 MM HG: CPT | Performed by: FAMILY MEDICINE

## 2024-11-11 PROCEDURE — 3008F BODY MASS INDEX DOCD: CPT | Performed by: FAMILY MEDICINE

## 2024-11-11 PROCEDURE — 80053 COMPREHEN METABOLIC PANEL: CPT

## 2024-11-11 PROCEDURE — 36415 COLL VENOUS BLD VENIPUNCTURE: CPT

## 2024-11-11 PROCEDURE — 85025 COMPLETE CBC W/AUTO DIFF WBC: CPT

## 2024-11-11 PROCEDURE — 93000 ELECTROCARDIOGRAM COMPLETE: CPT | Performed by: FAMILY MEDICINE

## 2024-11-11 PROCEDURE — 99396 PREV VISIT EST AGE 40-64: CPT | Performed by: FAMILY MEDICINE

## 2024-11-11 PROCEDURE — 3074F SYST BP LT 130 MM HG: CPT | Performed by: FAMILY MEDICINE

## 2024-11-11 PROCEDURE — 80061 LIPID PANEL: CPT

## 2024-11-11 PROCEDURE — 84443 ASSAY THYROID STIM HORMONE: CPT

## 2024-11-11 PROCEDURE — 81003 URINALYSIS AUTO W/O SCOPE: CPT

## 2024-11-11 NOTE — PATIENT INSTRUCTIONS
All adult screening ordered and done appropriate for patient's age and gender and risk factors and complaints.  Encouraged physical fitness and daily physical activity daily.  Monitor blood pressures and record at home. Limit salt intake.  Comply with medications.  H pylori test recommended.

## 2024-11-19 LAB
ATRIAL RATE: 81 BPM
P AXIS: 60 DEGREES
P-R INTERVAL: 174 MS
Q-T INTERVAL: 360 MS
QRS DURATION: 74 MS
QTC CALCULATION (BEZET): 418 MS
R AXIS: 71 DEGREES
T AXIS: 24 DEGREES
VENTRICULAR RATE: 81 BPM

## 2024-11-19 NOTE — PROGRESS NOTES
Subjective:     Patient ID: Joan Crocker is a 44 year old male.    This patient is a 44-year-old -American gentleman here for complete preventive care physical and for status update on any confirmed chronic medical illnesses and follow up on any previous labs or procedures that were suggestive or in need of further work up. Colonoscopy is due. Bowel, bladder, and sexual functions are intact.    Patient denies headaches, chest pain, dizziness, shortness of breath, acute visual changes, N/or exertional fatigue.    Patient does carry a major stress load.  Patient does complain of epigastric region discomfort which is somewhat intermittent.  There is an occasional component of chest discomfort which is fleeting and its nature.          History/Other:   Review of Systems  Current Outpatient Medications   Medication Sig Dispense Refill    triamcinolone 0.1 % External Ointment Apply to affected area twice a day until rash resolves (Patient not taking: Reported on 11/11/2024) 80 g 0    Na Sulfate-K Sulfate-Mg Sulf (SUPREP BOWEL PREP KIT) 17.5-3.13-1.6 GM/177ML Oral Solution Take as directed (Patient not taking: Reported on 11/11/2024) 1 each 0    Tadalafil 20 MG Oral Tab Take 1 tablet (20 mg total) by mouth daily as needed. (Patient not taking: Reported on 11/11/2024) 8 tablet 2     Allergies:Allergies[1]    Past Medical History:    Enlarged prostate    Varicocele    [LATERALITY NOT STATED]      Past Surgical History:   Procedure Laterality Date    Other surgical history  2013    office cystoscopy      Family History   Problem Relation Age of Onset    Cancer Father         prostate cancer    Diabetes Father     Prostate Cancer Father     Heart Disorder Father 80        CHF      Social History:   Social History     Socioeconomic History    Marital status:     Number of children: 3   Occupational History    Occupation: [employer]   Tobacco Use    Smoking status: Never    Smokeless tobacco: Never    Vaping Use    Vaping status: Never Used   Substance and Sexual Activity    Alcohol use: No    Drug use: Not Currently   Other Topics Concern    Caffeine Concern No     Social Drivers of Health      [facility], [facility]    Social Connections        Objective:   Vitals:    11/11/24 1531   BP: 116/76   Pulse:    Resp:    Temp:        Physical Exam  Constitutional:       General: He is not in acute distress.     Appearance: Normal appearance. He is not ill-appearing.   HENT:      Head: Normocephalic and atraumatic.      Right Ear: Tympanic membrane normal.      Left Ear: Tympanic membrane normal.      Nose: Nose normal.      Mouth/Throat:      Mouth: Mucous membranes are moist.   Cardiovascular:      Rate and Rhythm: Normal rate and regular rhythm.      Heart sounds:      No gallop.   Pulmonary:      Effort: Pulmonary effort is normal.      Breath sounds: Normal breath sounds.   Abdominal:      Palpations: Abdomen is soft. There is no mass.   Neurological:      Mental Status: He is alert and oriented to person, place, and time.   Psychiatric:         Mood and Affect: Mood normal.         Assessment & Plan:   1. Routine physical examination  General Well exam and the following labs have been ordered.  - CBC W Differential W Platelet [E]; Future  - Comp Metabolic Panel (14) [E]; Future  - Lipid Panel [E]; Future  - TSH [E]; Future  - Urinalysis, Routine [E]; Future  - PSA Total, Screen; Future    2. Chest pain, unspecified type  Ordered.  - EKG In-Office [12915]; normal sinus rate and rhythm.  No Q waves.  No ST-T wave changes.  Normal EKG.    3. Tetanus, diphtheria, and acellular pertussis (Tdap) vaccination declined  Declined by patient.  - TETANUS, DIPHTHERIA TOXOIDS AND ACELLULAR PERTUSIS VACCINE (TDAP), >7 YEARS, IM USE    4. Influenza vaccination declined by patient  Declined by patient.  - Fluzone trivalent vaccine, PF 0.5mL, 6mo+ (29720)    5. Colon cancer  screening  Referred.  - Gastro Referral - [place] [facility]    6. Epigastric pain  Ordered.  - H. Pylori Stool Ag, EIA [E]; Future      Orders Placed This Encounter   Procedures    CBC W Differential W Platelet [E]    Comp Metabolic Panel (14) [E]    Lipid Panel [E]    TSH [E]    Urinalysis, Routine [E]    H. Pylori Stool Ag, EIA [E]    PSA Total, Screen    TETANUS, DIPHTHERIA TOXOIDS AND ACELLULAR PERTUSIS VACCINE (TDAP), >7 YEARS, IM USE    Fluzone trivalent vaccine, PF 0.5mL, 6mo+ (88033)       Meds This Visit:  Requested Prescriptions      No prescriptions requested or ordered in this encounter       Imaging & Referrals:  TETANUS, DIPHTHERIA TOXOIDS AND ACELLULAR PERTUSIS VACCINE (TDAP), >7 YEARS, IM USE  INFLUENZA VACCINE, TRI, PRESERV FREE, 0.5 ML  ELECTROCARDIOGRAM, COMPLETE  GASTRO - INTERNAL     Patient Instructions   All adult screening ordered and done appropriate for patient's age and gender and risk factors and complaints.  Encouraged physical fitness and daily physical activity daily.  Monitor blood pressures and record at home. Limit salt intake.  Comply with medications.  H pylori test recommended.    Return in about 1 year (around 11/11/2025), or if symptoms worsen or fail to improve.         [1] No Known Allergies

## 2025-01-20 ENCOUNTER — TELEPHONE (OUTPATIENT)
Facility: CLINIC | Age: 45
End: 2025-01-20

## 2025-01-20 ENCOUNTER — TELEPHONE (OUTPATIENT)
Age: 45
End: 2025-01-20

## 2025-01-20 PROBLEM — K64.8 INTERNAL HEMORRHOIDS: Status: ACTIVE | Noted: 2025-01-20

## 2025-01-20 PROBLEM — K62.1 RECTAL POLYP: Status: ACTIVE | Noted: 2025-01-20

## 2025-01-20 PROCEDURE — 88305 TISSUE EXAM BY PATHOLOGIST: CPT | Performed by: INTERNAL MEDICINE

## 2025-01-20 NOTE — TELEPHONE ENCOUNTER
Recall colonoscopy in 1 year per Dr. Leonard    Colon done 1/20/2025    Health maintenance updated and message sent to patient outreach to repeat colonoscopy in 1 year

## 2025-01-20 NOTE — TELEPHONE ENCOUNTER
Giovanna Leonard MD Physician Signed 11:27 AM     Copy     GI staff: please place recall for colonoscopy in 1 year with an extended bowel prep with bisacodyl 5 mg x 3 days before the colonoscopy and with a small volume split dose bowel prep, such as Suprep or Moviprep.  The patient had nausea and vomiting with Gavilyte.

## 2025-01-20 NOTE — TELEPHONE ENCOUNTER
GI staff: please place recall for colonoscopy in 1 year with an extended bowel prep with bisacodyl 5 mg x 3 days before the colonoscopy and with a small volume split dose bowel prep, such as Suprep or Moviprep.  The patient had nausea and vomiting with Gavilyte.

## 2025-05-29 ENCOUNTER — LAB ENCOUNTER (OUTPATIENT)
Dept: LAB | Age: 45
End: 2025-05-29
Attending: FAMILY MEDICINE
Payer: COMMERCIAL

## 2025-05-29 ENCOUNTER — OFFICE VISIT (OUTPATIENT)
Dept: FAMILY MEDICINE CLINIC | Facility: CLINIC | Age: 45
End: 2025-05-29

## 2025-05-29 VITALS
SYSTOLIC BLOOD PRESSURE: 111 MMHG | HEIGHT: 72.5 IN | WEIGHT: 247 LBS | DIASTOLIC BLOOD PRESSURE: 67 MMHG | HEART RATE: 82 BPM | BODY MASS INDEX: 33.09 KG/M2 | RESPIRATION RATE: 16 BRPM | TEMPERATURE: 99 F

## 2025-05-29 DIAGNOSIS — R35.0 FREQUENT URINATION: Primary | ICD-10-CM

## 2025-05-29 DIAGNOSIS — N50.819 PERSISTENT TESTICULAR PAIN: ICD-10-CM

## 2025-05-29 DIAGNOSIS — R35.0 FREQUENT URINATION: ICD-10-CM

## 2025-05-29 LAB
BILIRUB UR QL: NEGATIVE
CLARITY UR: CLEAR
GLUCOSE UR-MCNC: NORMAL MG/DL
HGB UR QL STRIP.AUTO: NEGATIVE
KETONES UR-MCNC: NEGATIVE MG/DL
LEUKOCYTE ESTERASE UR QL STRIP.AUTO: NEGATIVE
NITRITE UR QL STRIP.AUTO: NEGATIVE
PH UR: 7 [PH] (ref 5–8)
PROT UR-MCNC: NEGATIVE MG/DL
SP GR UR STRIP: 1.02 (ref 1–1.03)
UROBILINOGEN UR STRIP-ACNC: NORMAL

## 2025-05-29 PROCEDURE — 87086 URINE CULTURE/COLONY COUNT: CPT

## 2025-05-29 PROCEDURE — 99214 OFFICE O/P EST MOD 30 MIN: CPT | Performed by: FAMILY MEDICINE

## 2025-05-29 PROCEDURE — 3078F DIAST BP <80 MM HG: CPT | Performed by: FAMILY MEDICINE

## 2025-05-29 PROCEDURE — 3008F BODY MASS INDEX DOCD: CPT | Performed by: FAMILY MEDICINE

## 2025-05-29 PROCEDURE — 3074F SYST BP LT 130 MM HG: CPT | Performed by: FAMILY MEDICINE

## 2025-05-29 PROCEDURE — 81003 URINALYSIS AUTO W/O SCOPE: CPT

## 2025-05-29 NOTE — PROGRESS NOTES
Subjective:   Patient ID: Joan Crocker is a 45 year old male.    Pt presents with 2 week hx of some urinary frequency and some testicular frequency. No dysuria. No fevers or hematuria or discharge. Has cut back on water.  Hx of varicocele in past and had procedure done with urology a number of years ago.   No trauma or injury.  Has cut back on coffee and drinking water.        History/Other:   Review of Systems   Constitutional:  Negative for fever.   Genitourinary:  Positive for frequency. Negative for dysuria, genital sores, penile discharge, penile pain and scrotal swelling. Testicular pain: discomfort.    Current Medications[1]  Allergies:Allergies[2]    Objective:   Physical Exam  Vitals reviewed.   Constitutional:       Appearance: Normal appearance. He is well-developed.   Abdominal:      General: Abdomen is flat. There is no distension.      Palpations: Abdomen is soft.      Tenderness: There is no abdominal tenderness. There is no right CVA tenderness, left CVA tenderness, guarding or rebound.      Hernia: There is no hernia in the left inguinal area or right inguinal area.   Genitourinary:     Penis: Normal.       Testes: Normal.         Right: Mass or tenderness not present.         Left: Mass or tenderness not present.      Comments: Some mild discomfort with palpation of testicles.  Neurological:      Mental Status: He is alert.         Assessment & Plan:   1. Frequent urination: unable to give urine sample now:  - After discussion with patient, will check urine testing as discussed; provide sample at lab;  To call if worse or not better. Encouraged fluids. Follow up and further management after testing     2. Persistent testicular pain:  - After discussion with patient, will check ultrasound of private area. Follow up and further management after testing. Call if any sig pains/ symptoms.         Orders Placed This Encounter   Procedures    Urinalysis, Routine    Urine Culture, Routine        Meds This Visit:  Requested Prescriptions      No prescriptions requested or ordered in this encounter       Imaging & Referrals:  US SCROTUM W/ DOPPLER (CPT=93975/02148)         [1]   Current Outpatient Medications   Medication Sig Dispense Refill    triamcinolone 0.1 % External Ointment Apply to affected area twice a day until rash resolves 80 g 0    Tadalafil 20 MG Oral Tab Take 1 tablet (20 mg total) by mouth daily as needed. 8 tablet 2   [2] No Known Allergies

## 2025-06-09 ENCOUNTER — HOSPITAL ENCOUNTER (OUTPATIENT)
Dept: ULTRASOUND IMAGING | Facility: HOSPITAL | Age: 45
Discharge: HOME | End: 2025-06-09
Attending: FAMILY MEDICINE
Payer: COMMERCIAL

## 2025-06-09 DIAGNOSIS — N50.819 PERSISTENT TESTICULAR PAIN: ICD-10-CM

## 2025-06-09 PROCEDURE — 93975 VASCULAR STUDY: CPT | Performed by: FAMILY MEDICINE

## 2025-06-09 PROCEDURE — 76870 US EXAM SCROTUM: CPT | Performed by: FAMILY MEDICINE

## 2025-06-19 ENCOUNTER — APPOINTMENT (OUTPATIENT)
Dept: GENERAL RADIOLOGY | Age: 45
End: 2025-06-19
Attending: PHYSICIAN ASSISTANT
Payer: COMMERCIAL

## 2025-06-19 ENCOUNTER — HOSPITAL ENCOUNTER (OUTPATIENT)
Age: 45
Discharge: HOME | End: 2025-06-19
Payer: COMMERCIAL

## 2025-06-19 VITALS
SYSTOLIC BLOOD PRESSURE: 120 MMHG | TEMPERATURE: 99 F | DIASTOLIC BLOOD PRESSURE: 69 MMHG | RESPIRATION RATE: 20 BRPM | HEART RATE: 83 BPM | OXYGEN SATURATION: 96 %

## 2025-06-19 DIAGNOSIS — M72.2 PLANTAR FASCIITIS: ICD-10-CM

## 2025-06-19 DIAGNOSIS — R07.89 CHEST PAIN, ATYPICAL: Primary | ICD-10-CM

## 2025-06-19 LAB
#MXD IC: 0.4 X10ˆ3/UL (ref 0.1–1)
ATRIAL RATE: 75 BPM
BUN BLD-MCNC: 14 MG/DL (ref 7–18)
CHLORIDE BLD-SCNC: 100 MMOL/L (ref 98–112)
CO2 BLD-SCNC: 29 MMOL/L (ref 21–32)
CREAT BLD-MCNC: 1.2 MG/DL (ref 0.7–1.3)
EGFRCR SERPLBLD CKD-EPI 2021: 76 ML/MIN/1.73M2 (ref 60–?)
GLUCOSE BLD-MCNC: 83 MG/DL (ref 70–99)
HCT VFR BLD AUTO: 44.2 % (ref 39–53)
HCT VFR BLD CALC: 51 % (ref 37–53)
HGB BLD-MCNC: 14 G/DL (ref 13–17.5)
ISTAT IONIZED CALCIUM FOR CHEM 8: 1.27 MMOL/L (ref 1.12–1.32)
LYMPHOCYTES # BLD AUTO: 2.4 X10ˆ3/UL (ref 1–4)
LYMPHOCYTES NFR BLD AUTO: 38.7 %
MCH RBC QN AUTO: 29.2 PG (ref 26–34)
MCHC RBC AUTO-ENTMCNC: 31.7 G/DL (ref 31–37)
MCV RBC AUTO: 92.3 FL (ref 80–100)
MIXED CELL %: 7.1 %
NEUTROPHILS # BLD AUTO: 3.5 X10ˆ3/UL (ref 1.5–7.7)
NEUTROPHILS NFR BLD AUTO: 54.2 %
P AXIS: 70 DEGREES
P-R INTERVAL: 190 MS
PLATELET # BLD AUTO: 223 X10ˆ3/UL (ref 150–450)
POTASSIUM BLD-SCNC: 4.5 MMOL/L (ref 3.6–5.1)
Q-T INTERVAL: 372 MS
QRS DURATION: 84 MS
QTC CALCULATION (BEZET): 415 MS
R AXIS: 80 DEGREES
RBC # BLD AUTO: 4.79 X10ˆ6/UL (ref 4.3–5.7)
SODIUM BLD-SCNC: 140 MMOL/L (ref 136–145)
T AXIS: 40 DEGREES
TROPONIN I BLD-MCNC: <0.02 NG/ML (ref ?–0.05)
VENTRICULAR RATE: 75 BPM
WBC # BLD AUTO: 6.3 X10ˆ3/UL (ref 4–11)

## 2025-06-19 PROCEDURE — 73620 X-RAY EXAM OF FOOT: CPT | Performed by: PHYSICIAN ASSISTANT

## 2025-06-19 PROCEDURE — 71046 X-RAY EXAM CHEST 2 VIEWS: CPT | Performed by: PHYSICIAN ASSISTANT

## 2025-06-19 PROCEDURE — 80047 BASIC METABLC PNL IONIZED CA: CPT | Performed by: PHYSICIAN ASSISTANT

## 2025-06-19 PROCEDURE — 93000 ELECTROCARDIOGRAM COMPLETE: CPT | Performed by: PHYSICIAN ASSISTANT

## 2025-06-19 PROCEDURE — 84484 ASSAY OF TROPONIN QUANT: CPT | Performed by: PHYSICIAN ASSISTANT

## 2025-06-19 PROCEDURE — 99214 OFFICE O/P EST MOD 30 MIN: CPT | Performed by: PHYSICIAN ASSISTANT

## 2025-06-19 PROCEDURE — A6448 LT COMPRES BAND <3"/YD: HCPCS | Performed by: PHYSICIAN ASSISTANT

## 2025-06-19 PROCEDURE — 85025 COMPLETE CBC W/AUTO DIFF WBC: CPT | Performed by: PHYSICIAN ASSISTANT

## 2025-06-19 RX ORDER — METHYLPREDNISOLONE 4 MG/1
TABLET ORAL
Qty: 1 EACH | Refills: 0 | Status: SHIPPED | OUTPATIENT
Start: 2025-06-19

## 2025-06-19 NOTE — DISCHARGE INSTRUCTIONS
READDRESS WITH PRIMARY CARE OVER THE WEEK AHEAD.     GO TO ER FOR BREAKTHROUGH CHANGES/CONCERNS BY INSTRUCTION PROVIDED.     FOLLOW UP WITH PODIATRY FOR ONGOING ISSUES WITH FOOT BY INSTRUCTION/REFERRAL

## 2025-06-19 NOTE — ED PROVIDER NOTES
No chief complaint on file.      HPI:     Joan Crocker is a 45 year old male who presents for evaluation of chest pain onset 2 days ago while sitting in the sun after workout.  Notes mid sternal chest pressure with slightly labored breathing lasting over 1 hour resolving without reoccurring episodes last 2 days.  Cardiac risk factors: Age.  Notes father history of CHF 80 years old.  Denies self or family history of ACS or hypercoagulable state.  Patient also states to 1 month of right distal arch pain with mobility without injury.  Denies associated fevers chills headache dizziness congestion cough sore throat neck pain active chest pain shortness of breath abdominal pain vomiting diarrhea dysuria hematuria medic easier upper or lower extremity weakness numbness or swelling.      PFSH    PFSH asessment screens reviewed and agree.  Nurses notes reviewed I agree with documentation.    Family History[1]  Family history reviewed with patient/caregiver and is not pertinent to presenting problem.  Social History     Socioeconomic History    Marital status:      Spouse name: Not on file    Number of children: 3    Years of education: Not on file    Highest education level: Not on file   Occupational History    Occupation: [employer]   Tobacco Use    Smoking status: Never    Smokeless tobacco: Never   Vaping Use    Vaping status: Never Used   Substance and Sexual Activity    Alcohol use: No    Drug use: Not Currently    Sexual activity: Not on file   Other Topics Concern     Service Not Asked    Blood Transfusions Not Asked    Caffeine Concern No    Occupational Exposure Not Asked    Hobby Hazards Not Asked    Sleep Concern Not Asked    Stress Concern Not Asked    Weight Concern Not Asked    Special Diet Not Asked    Back Care Not Asked    Exercise Not Asked    Bike Helmet Not Asked    Seat Belt Not Asked    Self-Exams Not Asked   Social History Narrative    Not on file     Social Drivers of  Health     Food Insecurity: Not on file   Transportation Needs: Not on file   Housing Stability: Not on file         ROS:   Positive for stated complaint: Chest pressure foot pain.  All other systems reviewed and negative except as noted above.  Constitutional and Vital Signs Reviewed.      Physical Exam:     Findings:    /69   Pulse 83   Temp 98.5 °F (36.9 °C) (Oral)   Resp 20   SpO2 96%   GENERAL: well developed, well nourished, well hydrated, no distress  SKIN: good skin turgor, no obvious rashes  NECK: supple, no adenopathy  CARDIO: RRR without murmur  EXTREMITIES: no cyanosis or edema. MENDEZ without difficulty  GI: soft, non-tender, normal bowel sounds  HEAD: normocephalic, atraumatic  EYES: sclera non icteric bilateral, conjunctiva clear  EARS: TMs clear bilaterally. Canals clear.  NOSE: nasal turbinates: pink, normal mucosa  THROAT: clear, without exudates, uvula midline, and airway patent  LUNGS: clear to auscultation bilaterally; no rales, rhonchi, or wheezes  NEURO: No focal deficits  PSYCH: Alert and oriented x3.  Answering questions appropriately.  Mood appropriate.    MDM/Assessment/Plan:   Orders for this encounter:    Orders Placed This Encounter    XR CHEST PA + LAT CHEST (CPT=71046)     What is the Relevant Clinical Indication / Reason for Exam?:   Chest Pain     Release to patient:   Immediate    XR FOOT WEIGHTBEARING (2 VIEWS), RIGHT   (CPT=73620)     What is the Relevant Clinical Indication / Reason for Exam?:   PAIN ARCH FOOT     Release to patient:   Immediate    POCT CBC     Release to patient:   Immediate    EKG 12 Lead     Release to patient:   Immediate    POCT ISTAT chem8 cartridge    ISTAT Troponin    iStat (Chem 8)    iStat (Troponin)    Ace wrap     To affected area       Labs performed this visit:  Recent Results (from the past 10 hours)   EKG 12 Lead    Collection Time: 06/19/25 11:16 AM   Result Value Ref Range    Ventricular rate 75 BPM    Atrial rate 75 BPM    P-R Interval  190 ms    QRS Duration 84 ms    Q-T Interval 372 ms    QTC Calculation (Bezet) 415 ms    P Axis 70 degrees    R Axis 80 degrees    T Axis 40 degrees   POCT CBC    Collection Time: 06/19/25 11:27 AM   Result Value Ref Range    WBC IC 6.3 4.0 - 11.0 x10ˆ3/uL    RBC IC 4.79 4.30 - 5.70 X10ˆ6/uL    HGB IC 14.0 13.0 - 17.5 g/dL    HCT IC 44.2 39.0 - 53.0 %    MCV IC 92.3 80.0 - 100.0 fL    MCH IC 29.2 26.0 - 34.0 pg    MCHC IC 31.7 31.0 - 37.0 g/dL    PLT .0 150.0 - 450.0 X10ˆ3/uL    # Neutrophil 3.5 1.5 - 7.7 X10ˆ3/uL    # Lymphocyte 2.4 1.0 - 4.0 X10ˆ3/uL    # Mixed Cells 0.4 0.1 - 1.0 X10ˆ3/uL    Neutrophil % 54.2 %    Lymphocyte % 38.7 %    Mixed Cell % 7.1 %   POCT ISTAT chem8 cartridge    Collection Time: 06/19/25 11:36 AM   Result Value Ref Range    ISTAT Sodium 140 136 - 145 mmol/L    ISTAT BUN 14 7 - 18 mg/dL    ISTAT Potassium 4.5 3.6 - 5.1 mmol/L    ISTAT Chloride 100 98 - 112 mmol/L    ISTAT Ionized Calcium 1.27 1.12 - 1.32 mmol/L    ISTAT Hematocrit 51 37 - 53 %    ISTAT Glucose 83 70 - 99 mg/dL    ISTAT TCO2 29 21 - 32 mmol/L    ISTAT Creatinine 1.20 0.70 - 1.30 mg/dL    eGFR-Cr 76 >=60 mL/min/1.73m2   ISTAT Troponin    Collection Time: 06/19/25 11:48 AM   Result Value Ref Range    ISTAT Troponin <0.02 <0.045 ng/mL ng/mL       MDM:  EKG 75 beats minute normal sinus rhythm no ST elevation or depression.  PERC negative.    Patient case discussed with Dr. Gresham who was agreeable with radiographs including foot and cardiac profile and cytology.    Patient metabolic profile within normal limits, troponin normal.  Heart score: 1.    Patient radiographs CHEST AND FOOT show no acute concern with foot examination history most suggestive of plantar fasciitis versus strain.  Discussed Ace wrap which was applied to the forefoot through the arch for further support and podiatry referral for ongoing issues or concerns beyond readdressing primary for chest pain history and further understanding, instructed on acute  changes warranting emergent reevaluation.  Happy with plan.    Diagnosis:    ICD-10-CM    1. Chest pain, atypical  R07.89 XR CHEST PA + LAT CHEST (CPT=71046)     XR CHEST PA + LAT CHEST (CPT=71046)      2. Plantar fasciitis  M72.2 XR FOOT WEIGHTBEARING (2 VIEWS), RIGHT   (CPT=73620)     XR FOOT WEIGHTBEARING (2 VIEWS), RIGHT   (CPT=73620)          All results reviewed and discussed with patient.  See AVS for detailed discharge instructions for your condition today.    Follow Up with:  David Kirkland,   [address] [place] [address]  [facility] [id number] [phone]    Schedule an appointment as soon as possible for a visit in 3 days  follow up; GO TO ER FOR BREAKTHROUGH CHANGES/CONCERNS.    Nyla Adkins, NITHIN  [address],  [place] [id number] [phone]    Schedule an appointment as soon as possible for a visit in 2 weeks  As needed, If symptoms worsen PODIATRY REFERRAL IF ONGOING FOOT ISSUES.         [1]   Family History  Problem Relation Age of Onset    Cancer Father         prostate cancer    Diabetes Father     Prostate Cancer Father     Heart Disorder Father 80        CHF

## 2025-06-19 NOTE — ED INITIAL ASSESSMENT (HPI)
Patient states he had chest pressure for the last two days.  He denies any pressure at this time.  He states since his wife was coming in he would get checked out.  He also states he is having pain on the top of his foot also for the last month that he would like to get checked out.  He denies any injury.

## 2025-07-28 ENCOUNTER — OFFICE VISIT (OUTPATIENT)
Dept: FAMILY MEDICINE CLINIC | Facility: CLINIC | Age: 45
End: 2025-07-28

## 2025-07-28 VITALS
OXYGEN SATURATION: 96 % | BODY MASS INDEX: 33.23 KG/M2 | HEIGHT: 72.5 IN | WEIGHT: 248 LBS | HEART RATE: 74 BPM | SYSTOLIC BLOOD PRESSURE: 109 MMHG | TEMPERATURE: 98 F | RESPIRATION RATE: 18 BRPM | DIASTOLIC BLOOD PRESSURE: 69 MMHG

## 2025-07-28 DIAGNOSIS — Z28.21 TETANUS, DIPHTHERIA, AND ACELLULAR PERTUSSIS (TDAP) VACCINATION DECLINED: ICD-10-CM

## 2025-07-28 DIAGNOSIS — M79.671 RIGHT FOOT PAIN: Primary | ICD-10-CM

## 2025-07-28 DIAGNOSIS — F43.29 GRIEF REACTION WITH PROLONGED BEREAVEMENT: ICD-10-CM

## 2025-07-28 PROCEDURE — 3078F DIAST BP <80 MM HG: CPT | Performed by: FAMILY MEDICINE

## 2025-07-28 PROCEDURE — 3008F BODY MASS INDEX DOCD: CPT | Performed by: FAMILY MEDICINE

## 2025-07-28 PROCEDURE — 99214 OFFICE O/P EST MOD 30 MIN: CPT | Performed by: FAMILY MEDICINE

## 2025-07-28 PROCEDURE — 3074F SYST BP LT 130 MM HG: CPT | Performed by: FAMILY MEDICINE

## 2025-07-28 NOTE — PROGRESS NOTES
Subjective:     Patient ID: Joan Crocker is a 45 year old male.    This patient is a well-established 45-year-old -American gentleman who reports a non trauma induced right foot pain over the last 5 to 6 months.  The discomfort is located in the solar aspect and at the pad of the foot.  The discomfort is appreciated most when he is without shoes walking around at home.     Unfortunately, the patient lost his father who is his friend and brother in the ministry.  Patient continues to try to navigate the loss of his dad along with the responsibilities of now being the  of his [employer].    Patient eligible for Tdap vaccine.        History/Other:   Review of Systems  Current Medications[1]  Allergies:Allergies[2]    Past Medical History[3]   Past Surgical History[4]   Family History[5]   Social History: Short Social Hx on File[6]     Objective:   Vitals:    07/28/25 1457   BP: 109/69   Pulse: 74   Resp: 18   Temp: 98 °F (36.7 °C)       Physical Exam  Constitutional:       General: He is not in acute distress.     Appearance: Normal appearance. He is not ill-appearing.   Musculoskeletal:        Feet:    Feet:      Comments: Discomfort across the ball of the right foot as depicted.  There is somewhat of a fallen arch with regards to the right foot.  Dorsal pedal pulses intact.  Neurological:      Mental Status: He is alert and oriented to person, place, and time.         Assessment & Plan:   1. Right foot pain  Patient be referred to podiatry.  The location and the story behind the discomfort suggests metatarsalgia.  Flattened arch with plantars fasciitis must be considered as well.  - Podiatry Referral - In Network    2. Grief reaction with prolonged bereavement  Referred.  - [facility] Referral - In Network    3. Tetanus, diphtheria, and acellular pertussis (Tdap) vaccination declined  Declined for now.  - TETANUS, DIPHTHERIA TOXOIDS AND ACELLULAR PERTUSIS VACCINE (TDAP), >7 YEARS, IM USE      No  orders of the defined types were placed in this encounter.      Meds This Visit:  Requested Prescriptions      No prescriptions requested or ordered in this encounter       Imaging & Referrals:  TETANUS, DIPHTHERIA TOXOIDS AND ACELLULAR PERTUSIS VACCINE (TDAP), >7 YEARS, IM USE     Patient Instructions   Referred to podiatry.  Referred to behavioral health for prolonged bereavement.    Return in about 3 months (around 10/28/2025), or if symptoms worsen or fail to improve.         [1]   Current Outpatient Medications   Medication Sig Dispense Refill    methylPREDNISolone (MEDROL) 4 MG Oral Tablet Therapy Pack Dosepack: take as directed (Patient not taking: Reported on 7/28/2025) 1 each 0    triamcinolone 0.1 % External Ointment Apply to affected area twice a day until rash resolves (Patient not taking: Reported on 6/19/2025) 80 g 0    Tadalafil 20 MG Oral Tab Take 1 tablet (20 mg total) by mouth daily as needed. (Patient not taking: Reported on 6/19/2025) 8 tablet 2   [2] No Known Allergies  [3]   Past Medical History:   Enlarged prostate    Varicocele    [LATERALITY NOT STATED]   [4]   Past Surgical History:  Procedure Laterality Date    Colonoscopy N/A 1/20/2025    Procedure: COLONOSCOPY;  Surgeon: Giovanna Leonard MD;  Location: [facility] MAIN OR    Other surgical history  2013    office cystoscopy   [5]   Family History  Problem Relation Age of Onset    Cancer Father         prostate cancer    Diabetes Father     Prostate Cancer Father     Heart Disorder Father 80        CHF   [6]   Social History  Socioeconomic History    Marital status:     Number of children: 3   Occupational History    Occupation: [employer]   Tobacco Use    Smoking status: Never    Smokeless tobacco: Never   Vaping Use    Vaping status: Never Used   Substance and Sexual Activity    Alcohol use: No    Drug use: Not Currently   Other Topics Concern    Caffeine Concern No

## (undated) NOTE — LETTER
[facility] [place] [address] [facility] [address]  [phone]     Patient: Emilie Hill   YOB: 1980   Date of Visit: 11/11/2020     Dear Employer,        November 11, 2020    At [facility], we are taking special Persons infected with SARS-CoV-2 who never develop COVID-19 symptoms may discontinue isolation and other precautions 10 days after the date of their first positive RT-PCR test for SARS-CoV-2 RNA.     Persons who are asymptomatic but have been exposed, CDC r

## (undated) NOTE — ED AVS SNAPSHOT
Maria Elena Rodriguez   MRN: [id number]    Department:  [facility] Emergency Department   Date of Visit:  7/7/2019           Disclosure     Insurance plans vary and the physician(s) referred by the ER may not be covered by your plan.  Please CARE PHYSICIAN AT ONCE OR RETURN IMMEDIATELY TO THE EMERGENCY DEPARTMENT. If you have been prescribed any medication(s), please fill your prescription right away and begin taking the medication(s) as directed.   If you believe that any of the medications

## (undated) NOTE — LETTER
Date & Time: 11/11/2020, 11:54 AM  Patient: Chance Francois  Encounter Provider(s):    Felix Paulino MD       To Whom It May Concern:    Dia Dimas was seen and treated in our department on 11/11/2020.  He should not return to wor

## (undated) NOTE — ED AVS SNAPSHOT
Oskar Brown   MRN: [id number]    Department:  [facility] Emergency Department   Date of Visit:  2/21/2018           Disclosure     Insurance plans vary and the physician(s) referred by the ER may not be covered by your plan.  Please con CARE PHYSICIAN AT ONCE OR RETURN IMMEDIATELY TO THE EMERGENCY DEPARTMENT. If you have been prescribed any medication(s), please fill your prescription right away and begin taking the medication(s) as directed.   If you believe that any of the medications

## (undated) NOTE — LETTER
Date & Time: 7/7/2019, 11:59 AM  Patient: Micha Amber Chavez Provider(s):    Charity Florez MD       To Whom It May Concern:    Gurjit Medina was seen and treated in our department on 7/7/2019. He is excused from work for 1-2 days .

## (undated) NOTE — LETTER
Date & Time: 6/29/2022, 4:45 PM  Patient: Rubén Lewis  Encounter Provider(s):    SABINA Briones       To Whom It May Concern:    Gretel Lujan was seen and treated in our department on 6/29/2022. He can return to work 7/5/2022. If you have any questions or concerns, please do not hesitate to call.       Lois MCFADDEN  _____________________________  NLOBHDGOY/UXD Signature

## (undated) NOTE — LETTER
Patient Name: Oskar Brown  : 1980  MRN: [id number]  Patient Address: [address]  ([id number])     [facility] is committed to the safety and well-being of our patients, members, emp If your symptoms get worse, call your healthcare provider immediately. 3. Get rest and stay hydrated.    4. If you have a medical appointment, call the healthcare provider ahead of time and tell them that you have or may have COVID-19.  5. For medical laurita fever-reducing medications; and  · Improvement in respiratory symptoms (e.g., cough, shortness of breath); and  · At least 10 days have passed since symptoms first appeared OR if asymptomatic patient or date of symptom onset is unclear then use 10 days pos donors must:    · Have had a confirmed diagnosis of COVID-19  · Be symptom-free for at least 14 days*    *Some people will be required to have a repeat COVID-19 test in order to be eligible to donate.  If you’re instructed by Yun that a repeat test is r prevent PASC?   The best way to prevent the long-term symptoms of COVID-19 is by getting the COVID 19 vaccine as soon as it is available to you, wear a mask in public, avoid large gatherings, wash your hands frequently, and stay at least 6 feet away from ot

## (undated) NOTE — LETTER
4/17/2023          To Whom It May Concern:    Li Johnson is currently under my medical care and may not return to work at this time. Please excuse Sindy Florez for 1 days. He may return to work on 4/18/23. Activity is restricted as follows: none. If you require additional information please contact our office.         Sincerely,    SABINA Romero          Document generated by:  SABINA Romero

## (undated) NOTE — LETTER
February 21, 2018    Patient: Cheng Levy   Date of Visit: 2/21/2018       To Whom It May Concern:    Jacki Casey was seen and treated in our emergency department on 2/21/2018. He may be excused from work on 2.22.18.     If you have any

## (undated) NOTE — LETTER
2/11/2018              Pedro Rodriguez        [place] [id number]         Dear Dillon greene,    2/8/18 urinalysis urine test normal.  Please continue urology plans as last discussed. I wish you the best of health.       Sincerely,

## (undated) NOTE — LETTER
No referring provider defined for this encounter.        02/09/18        Patient: Shalonda Berry   YOB: 1980   Date of Visit: 2/8/2018       Dear  Dr. Grace Delacruz, DO,      Many thanks  for referring Shalonda Berry to my water on a daily basis because of very intense daily physical workouts.   I explained possibility that he may have polydipsia causing polyuria and I recommend intake voiding diary and answers questions he agrees to do it.    (Z80.42) Family history of prost

## (undated) NOTE — LETTER
Date & Time: 2/21/2024, 9:53 AM  Patient: Joan Crocker  Encounter Provider(s):    Sloan Lazaro APRN       To Whom It May Concern:    Joan Crocker was seen and treated in our department on 2/21/2024. He can return to work 2/27/2024 as long as he wears a mask at all times for an additional 5 days.    If you have any questions or concerns, please do not hesitate to call.      FRANK GreenP-BC  _____________________________  Physician/APC Signature